# Patient Record
Sex: MALE | Race: NATIVE HAWAIIAN OR OTHER PACIFIC ISLANDER | NOT HISPANIC OR LATINO | URBAN - METROPOLITAN AREA
[De-identification: names, ages, dates, MRNs, and addresses within clinical notes are randomized per-mention and may not be internally consistent; named-entity substitution may affect disease eponyms.]

---

## 2019-04-14 ENCOUNTER — INPATIENT (INPATIENT)
Facility: HOSPITAL | Age: 84
LOS: 2 days | Discharge: NURSING, REHAB OR LONG-TERM CARE | DRG: 57 | End: 2019-04-17
Payer: MEDICARE

## 2019-04-14 VITALS
OXYGEN SATURATION: 96 % | RESPIRATION RATE: 20 BRPM | TEMPERATURE: 98 F | WEIGHT: 149.91 LBS | DIASTOLIC BLOOD PRESSURE: 78 MMHG | HEART RATE: 81 BPM | SYSTOLIC BLOOD PRESSURE: 120 MMHG

## 2019-04-14 DIAGNOSIS — R63.8 OTHER SYMPTOMS AND SIGNS CONCERNING FOOD AND FLUID INTAKE: ICD-10-CM

## 2019-04-14 DIAGNOSIS — Z91.89 OTHER SPECIFIED PERSONAL RISK FACTORS, NOT ELSEWHERE CLASSIFIED: ICD-10-CM

## 2019-04-14 DIAGNOSIS — I10 ESSENTIAL (PRIMARY) HYPERTENSION: ICD-10-CM

## 2019-04-14 DIAGNOSIS — I63.9 CEREBRAL INFARCTION, UNSPECIFIED: ICD-10-CM

## 2019-04-14 DIAGNOSIS — Z90.49 ACQUIRED ABSENCE OF OTHER SPECIFIED PARTS OF DIGESTIVE TRACT: Chronic | ICD-10-CM
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
## 2019-04-14 DIAGNOSIS — G20 PARKINSON'S DISEASE: ICD-10-CM

## 2019-04-14 DIAGNOSIS — N40.0 BENIGN PROSTATIC HYPERPLASIA WITHOUT LOWER URINARY TRACT SYMPTOMS: ICD-10-CM

## 2019-04-14 LAB
ALBUMIN SERPL ELPH-MCNC: 2.8 G/DL — LOW (ref 3.3–5)
ALP SERPL-CCNC: 108 U/L — SIGNIFICANT CHANGE UP (ref 40–120)
ALT FLD-CCNC: 11 U/L — SIGNIFICANT CHANGE UP (ref 10–45)
ANION GAP SERPL CALC-SCNC: 12 MMOL/L — SIGNIFICANT CHANGE UP (ref 5–17)
APPEARANCE UR: CLEAR — SIGNIFICANT CHANGE UP
APTT BLD: 22.4 SEC — LOW (ref 27.5–36.3)
AST SERPL-CCNC: 28 U/L — SIGNIFICANT CHANGE UP (ref 10–40)
BASOPHILS # BLD AUTO: 0.02 K/UL — SIGNIFICANT CHANGE UP (ref 0–0.2)
BASOPHILS NFR BLD AUTO: 0.2 % — SIGNIFICANT CHANGE UP (ref 0–2)
BILIRUB SERPL-MCNC: 0.7 MG/DL — SIGNIFICANT CHANGE UP (ref 0.2–1.2)
BILIRUB UR-MCNC: NEGATIVE — SIGNIFICANT CHANGE UP
BUN SERPL-MCNC: 27 MG/DL — HIGH (ref 7–23)
CALCIUM SERPL-MCNC: 8.6 MG/DL — SIGNIFICANT CHANGE UP (ref 8.4–10.5)
CHLORIDE SERPL-SCNC: 103 MMOL/L — SIGNIFICANT CHANGE UP (ref 96–108)
CO2 SERPL-SCNC: 22 MMOL/L — SIGNIFICANT CHANGE UP (ref 22–31)
COLOR SPEC: YELLOW — SIGNIFICANT CHANGE UP
CREAT SERPL-MCNC: 0.8 MG/DL — SIGNIFICANT CHANGE UP (ref 0.5–1.3)
DIFF PNL FLD: NEGATIVE — SIGNIFICANT CHANGE UP
EOSINOPHIL # BLD AUTO: 0.06 K/UL — SIGNIFICANT CHANGE UP (ref 0–0.5)
EOSINOPHIL NFR BLD AUTO: 0.5 % — SIGNIFICANT CHANGE UP (ref 0–6)
GLUCOSE SERPL-MCNC: 114 MG/DL — HIGH (ref 70–99)
GLUCOSE UR QL: NEGATIVE — SIGNIFICANT CHANGE UP
HCT VFR BLD CALC: 40.7 % — SIGNIFICANT CHANGE UP (ref 39–50)
HGB BLD-MCNC: 13.7 G/DL — SIGNIFICANT CHANGE UP (ref 13–17)
IMM GRANULOCYTES NFR BLD AUTO: 1 % — SIGNIFICANT CHANGE UP (ref 0–1.5)
INR BLD: 1.17 — HIGH (ref 0.88–1.16)
KETONES UR-MCNC: ABNORMAL MG/DL
LEUKOCYTE ESTERASE UR-ACNC: NEGATIVE — SIGNIFICANT CHANGE UP
LYMPHOCYTES # BLD AUTO: 0.87 K/UL — LOW (ref 1–3.3)
LYMPHOCYTES # BLD AUTO: 7.8 % — LOW (ref 13–44)
MCHC RBC-ENTMCNC: 31.1 PG — SIGNIFICANT CHANGE UP (ref 27–34)
MCHC RBC-ENTMCNC: 33.7 GM/DL — SIGNIFICANT CHANGE UP (ref 32–36)
MCV RBC AUTO: 92.3 FL — SIGNIFICANT CHANGE UP (ref 80–100)
MONOCYTES # BLD AUTO: 0.94 K/UL — HIGH (ref 0–0.9)
MONOCYTES NFR BLD AUTO: 8.4 % — SIGNIFICANT CHANGE UP (ref 2–14)
NEUTROPHILS # BLD AUTO: 9.18 K/UL — HIGH (ref 1.8–7.4)
NEUTROPHILS NFR BLD AUTO: 82.1 % — HIGH (ref 43–77)
NITRITE UR-MCNC: NEGATIVE — SIGNIFICANT CHANGE UP
NRBC # BLD: 0 /100 WBCS — SIGNIFICANT CHANGE UP (ref 0–0)
PH UR: 6 — SIGNIFICANT CHANGE UP (ref 5–8)
PLATELET # BLD AUTO: 234 K/UL — SIGNIFICANT CHANGE UP (ref 150–400)
POTASSIUM SERPL-MCNC: 4 MMOL/L — SIGNIFICANT CHANGE UP (ref 3.5–5.3)
POTASSIUM SERPL-SCNC: 4 MMOL/L — SIGNIFICANT CHANGE UP (ref 3.5–5.3)
PROT SERPL-MCNC: 6.5 G/DL — SIGNIFICANT CHANGE UP (ref 6–8.3)
PROT UR-MCNC: ABNORMAL MG/DL
PROTHROM AB SERPL-ACNC: 13.3 SEC — HIGH (ref 10–12.9)
RBC # BLD: 4.41 M/UL — SIGNIFICANT CHANGE UP (ref 4.2–5.8)
RBC # FLD: 13.6 % — SIGNIFICANT CHANGE UP (ref 10.3–14.5)
SODIUM SERPL-SCNC: 137 MMOL/L — SIGNIFICANT CHANGE UP (ref 135–145)
SP GR SPEC: 1.01 — SIGNIFICANT CHANGE UP (ref 1–1.03)
UROBILINOGEN FLD QL: 1 E.U./DL — SIGNIFICANT CHANGE UP
WBC # BLD: 11.18 K/UL — HIGH (ref 3.8–10.5)
WBC # FLD AUTO: 11.18 K/UL — HIGH (ref 3.8–10.5)

## 2019-04-14 PROCEDURE — 70450 CT HEAD/BRAIN W/O DYE: CPT | Mod: 26

## 2019-04-14 PROCEDURE — 71101 X-RAY EXAM UNILAT RIBS/CHEST: CPT | Mod: 26,RT

## 2019-04-14 PROCEDURE — 73130 X-RAY EXAM OF HAND: CPT | Mod: 26,RT

## 2019-04-14 PROCEDURE — 72125 CT NECK SPINE W/O DYE: CPT | Mod: 26

## 2019-04-14 PROCEDURE — 93010 ELECTROCARDIOGRAM REPORT: CPT

## 2019-04-14 PROCEDURE — 99285 EMERGENCY DEPT VISIT HI MDM: CPT | Mod: 25

## 2019-04-14 PROCEDURE — 99222 1ST HOSP IP/OBS MODERATE 55: CPT | Mod: GC

## 2019-04-14 RX ORDER — ASPIRIN/CALCIUM CARB/MAGNESIUM 324 MG
1 TABLET ORAL
Qty: 0 | Refills: 0 | COMMUNITY

## 2019-04-14 RX ORDER — FINASTERIDE 5 MG/1
5 TABLET, FILM COATED ORAL DAILY
Qty: 0 | Refills: 0 | Status: DISCONTINUED | OUTPATIENT
Start: 2019-04-14 | End: 2019-04-17

## 2019-04-14 RX ORDER — SODIUM CHLORIDE 9 MG/ML
1000 INJECTION INTRAMUSCULAR; INTRAVENOUS; SUBCUTANEOUS ONCE
Qty: 0 | Refills: 0 | Status: COMPLETED | OUTPATIENT
Start: 2019-04-14 | End: 2019-04-14

## 2019-04-14 RX ORDER — HEPARIN SODIUM 5000 [USP'U]/ML
5000 INJECTION INTRAVENOUS; SUBCUTANEOUS EVERY 8 HOURS
Qty: 0 | Refills: 0 | Status: DISCONTINUED | OUTPATIENT
Start: 2019-04-14 | End: 2019-04-17

## 2019-04-14 RX ORDER — CARBIDOPA AND LEVODOPA 25; 100 MG/1; MG/1
1 TABLET ORAL
Qty: 0 | Refills: 0 | COMMUNITY

## 2019-04-14 RX ORDER — CARBIDOPA AND LEVODOPA 25; 100 MG/1; MG/1
0 TABLET ORAL
Qty: 0 | Refills: 0 | COMMUNITY

## 2019-04-14 RX ORDER — ASPIRIN/CALCIUM CARB/MAGNESIUM 324 MG
81 TABLET ORAL DAILY
Qty: 0 | Refills: 0 | Status: DISCONTINUED | OUTPATIENT
Start: 2019-04-14 | End: 2019-04-17

## 2019-04-14 RX ORDER — CARBIDOPA AND LEVODOPA 25; 100 MG/1; MG/1
1 TABLET ORAL EVERY 6 HOURS
Qty: 0 | Refills: 0 | Status: DISCONTINUED | OUTPATIENT
Start: 2019-04-14 | End: 2019-04-17

## 2019-04-14 RX ORDER — FINASTERIDE 5 MG/1
0 TABLET, FILM COATED ORAL
Qty: 0 | Refills: 0 | COMMUNITY

## 2019-04-14 RX ADMIN — SODIUM CHLORIDE 1000 MILLILITER(S): 9 INJECTION INTRAMUSCULAR; INTRAVENOUS; SUBCUTANEOUS at 14:34

## 2019-04-14 RX ADMIN — HEPARIN SODIUM 5000 UNIT(S): 5000 INJECTION INTRAVENOUS; SUBCUTANEOUS at 22:29

## 2019-04-14 NOTE — H&P ADULT - PROBLEM SELECTOR PLAN 1
Patient presenting with increasing falls 2-3 with no LOC but has hit head while trying to get up from fall. No AC but is ASA 81mg at home. CT head with no intracranial hemorrhage or calvarial fracture generalized volume loss with small vessel ischemic and lacunar disease. Cervicothoracic levoscoliosis with superimposed approximately 4 mm anterolisthesis of C3 on C4. Multilevel degenerative disc disease with neural foramen narrowing. Patient denied chest pain, SOB, palpitations preceding falls but does admit to lightheadedness when getting to seated position and from seated to standing position. Falls likely mechanical, possibly due to progession of Parkinson's Disease especially in the setting of non-adherence vs. orthostatic hypotension given symptomatic when seated to rise vs. deconditioning. Unlikely neurologic or cardiogenic given lack of symptoms, CT imaging negative.  -PT consult  -social work consult  -f/u TSH  -f/u folate, vitamin B12  -f/u CK  -fall risk precautions  -ambulate with assistance  -f/u orthostatics  -holding ASA 81mg in the setting of fall risk Patient presenting with increasing falls 2-3 with no LOC but has hit head while trying to get up from fall. No AC but is ASA 81mg at home. CT head with no intracranial hemorrhage or calvarial fracture generalized volume loss with small vessel ischemic and lacunar disease. Cervicothoracic levoscoliosis with superimposed approximately 4 mm anterolisthesis of C3 on C4. Multilevel degenerative disc disease with neural foramen narrowing. Patient denied chest pain, SOB, palpitations preceding falls but does admit to lightheadedness when getting to seated position and from seated to standing position. Falls likely mechanical, possibly due to progession of Parkinson's Disease especially in the setting of non-adherence vs. orthostatic hypotension given symptomatic when seated to rise vs. deconditioning. Unlikely neurologic or cardiogenic given lack of symptoms, CT imaging negative.  -PT consult  -social work consult  -f/u TSH  -f/u vitamin B12  -f/u CK  -fall risk precautions  -ambulate with assistance  -f/u orthostatics

## 2019-04-14 NOTE — H&P ADULT - PROBLEM SELECTOR PLAN 7
1) PCP Contacted on Admission: (Y/N) --> Name & Phone #: PCP will require collateral in the AM   2) Date of Contact with PCP:  3) PCP Contacted at Discharge: (Y/N, N/A)  4) Summary of Handoff Given to PCP:   5) Post-Discharge Appointment Date and Location:

## 2019-04-14 NOTE — ED PROVIDER NOTE - OBJECTIVE STATEMENT
85 yo male h/o parkinson's, htn c/o freq falls, inability to get out of bed today and that his family is very concerned about his safety walking and living alone.  Wife (who does not live w pt) is concerned that pt is no taking his sinemet 25/250 qid as he is supposed to bc he is sleeping.  Pt reports he struck his head while trying to get out of bed - No ha, change in vision/speech/gait, numbness or weakness in ext, n/v.  Pt also reports fall x 2 in the past 2-3 wk - injured R hand - now better but occasionally painful or numb (not now) and R chest wall - pain resolved.  Pt notes occasional neck pain and clicking sensation x 2-3 wk - none now.  No uri sx, cough, sob, abd pain, n/v/d, dysuria.  + urinary freq so pt has been restricting his liquids bc it's sometimes too hard to walk.  Pt lives in [place] but daughter picked him up and brought him to [facility] today.

## 2019-04-14 NOTE — H&P ADULT - NSHPSOCIALHISTORY_GEN_ALL_CORE
Tobacco use: Former smoker on social occasions. Poor historian regarding duration and how many but quit years ago.   EtOH use: Infrequent  Illicit drug use: Denies    Living situation: lives at home alone with no HHA, manages his own finances, medications, ADLs. Ambulates with cane at baseline.

## 2019-04-14 NOTE — ED ADULT NURSE NOTE - NSIMPLEMENTINTERV_GEN_ALL_ED
Implemented All Fall with Harm Risk Interventions:  [place] to call system. Call bell, personal items and telephone within reach. Instruct patient to call for assistance. Room bathroom lighting operational. Non-slip footwear when patient is off stretcher. Physically safe environment: no spills, clutter or unnecessary equipment. Stretcher in lowest position, wheels locked, appropriate side rails in place. Provide visual cue, wrist band, yellow gown, etc. Monitor gait and stability. Monitor for mental status changes and reorient to person, place, and time. Review medications for side effects contributing to fall risk. Reinforce activity limits and safety measures with patient and family. Provide visual clues: red socks.

## 2019-04-14 NOTE — H&P ADULT - NSHPOUTPATIENTPROVIDERS_GEN_ALL_CORE
Dr. Red Sánchez (Neuro: [phone]) Dr. Red Sánchez (Neuro: [phone])  Dr. Dale Miller (Cardiology: [phone])

## 2019-04-14 NOTE — ED ADULT TRIAGE NOTE - CHIEF COMPLAINT QUOTE
op pt brought in by daughter c/o increased weakness and forgetfulness for a few days now and falling yesterday, hitting his head. sustained laceration to the left side of forehead. pmh Parkinson's.

## 2019-04-14 NOTE — H&P ADULT - PROBLEM SELECTOR PLAN 2
Patient on home Carbidopa/Levodopa 25/250mg q6hrs with non-adherence to medication. Patient follows Dr. Red Sánchez for Parkinson's disease  -c/w home Carbidopa/Levodopa 25/250mg q6hrs   -collateral with home Neurologist in the AM  -consider neuro consult Patient on home Carbidopa/Levodopa 25/250mg q6hrs with non-adherence to medication. Patient follows Dr. Red Sánchez for Parkinson's disease  -c/w home Carbidopa/Levodopa 25/250mg q6hrs   -collateral with home Neurologist in the AM

## 2019-04-14 NOTE — H&P ADULT - NSICDXPASTMEDICALHX_GEN_ALL_CORE_FT
PAST MEDICAL HISTORY:  BPH (benign prostatic hyperplasia)     Diverticulitis s/p partial colectomy 20 years ago    HTN (hypertension)     Parkinsons disease     Stroke CVA 2010 no residual deficits

## 2019-04-14 NOTE — ED PROVIDER NOTE - MUSCULOSKELETAL, MLM
Spine appears normal, neck/back nontender, range of motion is not limited, no muscle or joint tenderness, old ecchymosis R 2nd mcp jt

## 2019-04-14 NOTE — H&P ADULT - NSHPPHYSICALEXAM_GEN_ALL_CORE
VITAL SIGNS:  T(C): 37.1 (04-14-19 @ 18:17), Max: 37.1 (04-14-19 @ 18:17)  T(F): 98.7 (04-14-19 @ 18:17), Max: 98.7 (04-14-19 @ 18:17)  HR: 72 (04-14-19 @ 18:17) (68 - 81)  BP: 118/69 (04-14-19 @ 18:17) (108/62 - 120/78)  BP(mean): --  RR: 18 (04-14-19 @ 18:17) (18 - 20)  SpO2: 95% (04-14-19 @ 18:17) (94% - 96%)  Wt(kg): --    PHYSICAL EXAM:    Constitutional: WDWN resting comfortably in bed; NAD  Head: Adhesive bandage superior to the right eyebrow clean, dry, intact; otherwise NC/AT  Eyes: PERRL, EOMI, clear conjunctiva  ENT: no nasal discharge; uvula midline, no oropharyngeal erythema or exudates; MMM  Neck: supple; mild JVD  Respiratory: CTA B/L; no W/R/R, no retractions  Cardiac: +S1/S2; RRR; no murmur heard  Gastrointestinal: soft, nondistended with mild diffuse abdominal tenderness to palpation. no rebound or guarding; +BSx4  Back: spine midline, no bony tenderness or step-offs  Extremities: WWP, no clubbing or cyanosis; no peripheral edema  Vascular: 2+ radial, DP/PT pulses B/L  Neurologic: AAOx3; CNII-XII grossly intact; no focal deficits    - Mental Status:  AAOx2-3 (states name, date off by 2 days but knows month and year, knows in hospital not which one); speech is fluent  - Cranial Nerves II-XII:    II:  PERRLA; visual fields are full to confrontation  III, IV, VI:  EOMI, no nystagmus  V:  facial sensation is intact in the V1-V3 distribution bilaterally.  VII:  face is symmetric with normal eye closure and smile  VIII:  hearing is intact to finger rub  IX, X:  uvula is midline and soft palate rises symmetrically  XI:  head turning and shoulder shrug are intact bilaterally  XII:  tongue protrudes in the midline  - Motor:  strength is 5/5 throughout; normal muscle bulk and tone throughout; no pronator drift. Mild cogwheel rigidity b/l UE that improved with continued passive flexion and extension. Bradykinesia present.  - Sensory:  grossly intact throughout  - Coordination:  right finger-nose-finger with dysmetria compared with left. Intact heel-knee-shin intact without dysmetria b/l  - Gait:  deferred

## 2019-04-14 NOTE — H&P ADULT - PROBLEM SELECTOR PLAN 3
Patient on home Novasc 5mg daily and 1/2 tab of Lisinopril 100mg daily at home  -holding for now given normotensive and may contribute to falls  -If BPs elevated, can consider restarting

## 2019-04-14 NOTE — ED ADULT NURSE NOTE - OBJECTIVE STATEMENT
85 y/o M walked brought in from front [facility] c/o generalized weakness x 1 week. Daughter at bedised reports, Hx of parkinsons. Pt has become increasingly forgetful and weak the past couple of days. unable to walk unassisted. Used wheelchair to get here. Fall multiple times over past week, Laceration noted to R merrick. No bleeding or swelling noted at site. no blood thinners. 87 y/o M walked brought in from front [facility] c/o generalized weakness x 1 week. Daughter at bedside reports, Hx of parkinsons. Pt has become increasingly forgetful and weak the past couple of days. unable to walk unassisted. Used wheelchair to get here. Fell multiple times over past week, + hit head, no LOC. Laceration noted to R forehead. No bleeding or swelling noted at site. no blood thinners. c/o occasional headache to frontal region ,no headache now. Incontinent X mos. daughter would like admission. no fevers, chills, SOB, n/v/d, abd pain. 85 y/o a&ox4 M walked brought in from front triage c/o generalized weakness x 1 week. Daughter at bedside reports, Hx of parkinsons. Pt has become increasingly forgetful and weak the past couple of days. unable to walk unassisted. Used wheelchair to get here. Fell multiple times over past week, + hit head, no LOC. Laceration noted to R forehead. No bleeding or swelling noted at site. no blood thinners. c/o occasional headache to frontal region ,no headache now. Incontinent X mos. daughter would like admission. no fevers, chills, SOB, n/v/d, abd pain.

## 2019-04-14 NOTE — H&P ADULT - PROBLEM SELECTOR PLAN 4
Patient and family attest to history of CVA in 2010 with no residual deficits.   -outpatient collateral Patient and family attest to history of CVA in 2010 with no residual deficits.   -unclear why patient is not on statin at home. will require outpatient collateral from neurologist   -c/w ASA 81mg daily

## 2019-04-14 NOTE — H&P ADULT - ASSESSMENT
Patient is a 86M PMHx of Parkinson's Disease, prior CVA 2010 with no residual deficits, HTN, BPH, diverticulitis s/p partial colectomy ~20 years ago, presenting for multiple falls over the past 1-2 weeks likely mechanical due to deconditioning vs. progression of Parkinson's disease.

## 2019-04-14 NOTE — H&P ADULT - HISTORY OF PRESENT ILLNESS
Patient is a 86M PMHx of Parkinson's Disease, prior CVA 2010 with no residual deficits, HTN, BPH, diverticulitis s/p partial colectomy ~20 years ago, presenting for multiple falls over the past 1-2 weeks. Patient and family at bedside described that patient has fell ~2-3 times with no LOC and did hit head while trying to get up s/p one of his falls yesterday. Patient endorsing right rib pain along the mid-axillary line and right hand numbness s/p fall and laying on his hand. Right rib pain improved, right hand numbness persists. Prior to increased freq of falling, patient had 1 mechanical fall 6 weeks ago after placing foot wrong on step. Patient denies ever feeling palpitations, chest pain, SOB but admitted to lightheadedness with sitting upward and when quickly standing from seated position. Patient admitted to occasional b/l ankle swelling that resolves at the end of the day and with PO Lasix PRN. Of note, patient is non-adherent with his home Carbidopa/Levodopa 25/250mg q6hrs, lives at home alone with no HHA, manages his own finances, medications, ADLs. Ambulates with cane at baseline. Patient also reported increased urinary frequency but no dysuria, which prompted him to decrease his PO fluid intake for fear of having to get up to use the restroom. Patient denied fevers, chills, night sweats, chest pain, SOB, n/v/d/c, headache, melena, hematochezia, dysphagia.     In the ED: vitals Patient is a 86M PMHx of Parkinson's Disease, prior CVA 2010 with no residual deficits, HTN, BPH, diverticulitis s/p partial colectomy ~20 years ago, presenting for multiple falls over the past 1-2 weeks. Patient and family at bedside described that patient has fell ~2-3 times with no LOC and did hit head while trying to get up s/p one of his falls yesterday. Patient endorsing right rib pain along the mid-axillary line and right hand numbness s/p fall and laying on his hand. Right rib pain improved, right hand numbness persists. Prior to increased freq of falling, patient had 1 mechanical fall 6 weeks ago after placing foot wrong on step. Patient denies ever feeling palpitations, chest pain, SOB but admitted to lightheadedness with sitting upward and when quickly standing from seated position. Patient admitted to occasional b/l ankle swelling that resolves at the end of the day and with PO Lasix PRN. Of note, patient is non-adherent with his home Carbidopa/Levodopa 25/250mg q6hrs, lives at home alone with no HHA, manages his own finances, medications, ADLs. Ambulates with cane at baseline. Patient also reported increased urinary frequency but no dysuria, which prompted him to decrease his PO fluid intake for fear of having to get up to use the restroom. Patient denied fevers, chills, night sweats, chest pain, SOB, n/v/d/c, headache, melena, hematochezia, dysphagia.     In the ED: vitals 98.2F, HR 81, /78, RR 20, SpO2 96% RA. Labs s/f WBC 11.18, INR 1.17, BUN 27. UA with trace urine ketones, trace bacteria, trace protein. CT head demonstrated no intracranial hemorrhage or calvarial fracture but with generalized volume loss with small vessel ischemic and lacunar disease. CT cervical spine with cervicothoracic levoscoliosis with superimposed approximately 4 mm anterolisthesis of C3 on C4; multilevel degenerative disc disease with neural foramen narrowing. EKG with NSR and left anterior fascicular block. Patient received IV NS bolus 1L x1. Patient admitted to [facility] for further management of recurrent falls.

## 2019-04-14 NOTE — ED PROVIDER NOTE - CARE PLAN
Principal Discharge DX:	Parkinsons disease  Secondary Diagnosis:	Failure to thrive in adult  Secondary Diagnosis:	Falls

## 2019-04-14 NOTE — H&P ADULT - NSHPLABSRESULTS_GEN_ALL_CORE
.  LABS:                         13.7   11.18 )-----------( 234      ( 2019 14:07 )             40.7     14    137  |  103  |  27<H>  ----------------------------<  114<H>  4.0   |  22  |  0.80    Ca    8.6      2019 14:07    TPro  6.5  /  Alb  2.8<L>  /  TBili  0.7  /  DBili  x   /  AST  28  /  ALT  11  /  AlkPhos  108  14    PT/INR - ( 2019 14:07 )   PT: 13.3 sec;   INR: 1.17          PTT - ( 2019 14:07 )  PTT:22.4 sec  Urinalysis Basic - ( 2019 14:19 )    Color: Yellow / Appearance: Clear / S.010 / pH: x  Gluc: x / Ketone: Trace mg/dL  / Bili: Negative / Urobili: 1.0 E.U./dL   Blood: x / Protein: Trace mg/dL / Nitrite: NEGATIVE   Leuk Esterase: NEGATIVE / RBC: < 5 /HPF / WBC < 5 /HPF   Sq Epi: x / Non Sq Epi: 0-5 /HPF / Bacteria: Present /HPF                RADIOLOGY, EKG & ADDITIONAL TESTS: Reviewed.   < from: CT Cervical Spine No Cont (19 @ 15:41) >    IMPRESSION: Cervicothoracic levoscoliosis with superimposed approximately   4 mm anterolisthesis of C3 on C4. Multilevel degenerative disc disease   with neural foramen narrowing as described above.    < end of copied text >    < from: CT Head No Cont (19 @ 15:41) >    IMPRESSION: No intracranial hemorrhage or calvarial fracture generalized   volume loss with small vessel ischemic and lacunar disease.

## 2019-04-14 NOTE — ED ADULT NURSE NOTE - CHIEF COMPLAINT QUOTE
pt brought in by daughter c/o increased weakness and forgetfulness for a few days now and falling yesterday, hitting his head. sustained laceration to the left side of forehead. pmh Parkinson's.

## 2019-04-14 NOTE — ED PROVIDER NOTE - CLINICAL SUMMARY MEDICAL DECISION MAKING FREE TEXT BOX
Pt c/o increased falls, difficulty ambulating and caring for himself, ?ably compliant w his sinemet.  Pt w chi, neck pain (none now, x 2-3 wk), R cw pain (none now, last wk), and R hand pain (none now, last wk) s/p falls.  Suspect pt w worsening parkinson's, mild dehydration,  and ftt, deconditioning.  Pt needs admit for pt safety and ALEIDA placement, maximization of his Parksinson's treatment.  Pt initially reluctant, but now agreeing to stay after discussion w CM.  No sx to suggest infection adding to pt's sx.  Plan labs, ct head/cspine, xrays, ua, ivf, admit.

## 2019-04-15 LAB
ANION GAP SERPL CALC-SCNC: 8 MMOL/L — SIGNIFICANT CHANGE UP (ref 5–17)
BLD GP AB SCN SERPL QL: NEGATIVE — SIGNIFICANT CHANGE UP
BUN SERPL-MCNC: 19 MG/DL — SIGNIFICANT CHANGE UP (ref 7–23)
CALCIUM SERPL-MCNC: 8.3 MG/DL — LOW (ref 8.4–10.5)
CHLORIDE SERPL-SCNC: 110 MMOL/L — HIGH (ref 96–108)
CK SERPL-CCNC: 82 U/L — SIGNIFICANT CHANGE UP (ref 30–200)
CO2 SERPL-SCNC: 21 MMOL/L — LOW (ref 22–31)
CREAT SERPL-MCNC: 0.68 MG/DL — SIGNIFICANT CHANGE UP (ref 0.5–1.3)
CULTURE RESULTS: NO GROWTH — SIGNIFICANT CHANGE UP
GLUCOSE SERPL-MCNC: 113 MG/DL — HIGH (ref 70–99)
HBA1C BLD-MCNC: 5.1 % — SIGNIFICANT CHANGE UP (ref 4–5.6)
HCT VFR BLD CALC: 42.8 % — SIGNIFICANT CHANGE UP (ref 39–50)
HGB BLD-MCNC: 13.9 G/DL — SIGNIFICANT CHANGE UP (ref 13–17)
MAGNESIUM SERPL-MCNC: 2.1 MG/DL — SIGNIFICANT CHANGE UP (ref 1.6–2.6)
MCHC RBC-ENTMCNC: 30.7 PG — SIGNIFICANT CHANGE UP (ref 27–34)
MCHC RBC-ENTMCNC: 32.5 GM/DL — SIGNIFICANT CHANGE UP (ref 32–36)
MCV RBC AUTO: 94.5 FL — SIGNIFICANT CHANGE UP (ref 80–100)
NRBC # BLD: 0 /100 WBCS — SIGNIFICANT CHANGE UP (ref 0–0)
PHOSPHATE SERPL-MCNC: 3.1 MG/DL — SIGNIFICANT CHANGE UP (ref 2.5–4.5)
PLATELET # BLD AUTO: 245 K/UL — SIGNIFICANT CHANGE UP (ref 150–400)
POTASSIUM SERPL-MCNC: 3.8 MMOL/L — SIGNIFICANT CHANGE UP (ref 3.5–5.3)
POTASSIUM SERPL-SCNC: 3.8 MMOL/L — SIGNIFICANT CHANGE UP (ref 3.5–5.3)
RBC # BLD: 4.53 M/UL — SIGNIFICANT CHANGE UP (ref 4.2–5.8)
RBC # FLD: 13.7 % — SIGNIFICANT CHANGE UP (ref 10.3–14.5)
RH IG SCN BLD-IMP: NEGATIVE — SIGNIFICANT CHANGE UP
SODIUM SERPL-SCNC: 139 MMOL/L — SIGNIFICANT CHANGE UP (ref 135–145)
SPECIMEN SOURCE: SIGNIFICANT CHANGE UP
TSH SERPL-MCNC: 0.56 UIU/ML — SIGNIFICANT CHANGE UP (ref 0.35–4.94)
VIT B12 SERPL-MCNC: 1115 PG/ML — SIGNIFICANT CHANGE UP (ref 232–1245)
WBC # BLD: 8.59 K/UL — SIGNIFICANT CHANGE UP (ref 3.8–10.5)
WBC # FLD AUTO: 8.59 K/UL — SIGNIFICANT CHANGE UP (ref 3.8–10.5)

## 2019-04-15 PROCEDURE — 99233 SBSQ HOSP IP/OBS HIGH 50: CPT | Mod: GC

## 2019-04-15 RX ORDER — POTASSIUM CHLORIDE 20 MEQ
20 PACKET (EA) ORAL ONCE
Qty: 0 | Refills: 0 | Status: COMPLETED | OUTPATIENT
Start: 2019-04-15 | End: 2019-04-15

## 2019-04-15 RX ORDER — SODIUM CHLORIDE 9 MG/ML
500 INJECTION INTRAMUSCULAR; INTRAVENOUS; SUBCUTANEOUS ONCE
Qty: 0 | Refills: 0 | Status: COMPLETED | OUTPATIENT
Start: 2019-04-15 | End: 2019-04-15

## 2019-04-15 RX ADMIN — CARBIDOPA AND LEVODOPA 1 TABLET(S): 25; 100 TABLET ORAL at 17:14

## 2019-04-15 RX ADMIN — HEPARIN SODIUM 5000 UNIT(S): 5000 INJECTION INTRAVENOUS; SUBCUTANEOUS at 22:53

## 2019-04-15 RX ADMIN — Medication 20 MILLIEQUIVALENT(S): at 09:33

## 2019-04-15 RX ADMIN — CARBIDOPA AND LEVODOPA 1 TABLET(S): 25; 100 TABLET ORAL at 07:26

## 2019-04-15 RX ADMIN — SODIUM CHLORIDE 2000 MILLILITER(S): 9 INJECTION INTRAMUSCULAR; INTRAVENOUS; SUBCUTANEOUS at 16:50

## 2019-04-15 RX ADMIN — Medication 81 MILLIGRAM(S): at 11:32

## 2019-04-15 RX ADMIN — CARBIDOPA AND LEVODOPA 1 TABLET(S): 25; 100 TABLET ORAL at 11:32

## 2019-04-15 RX ADMIN — CARBIDOPA AND LEVODOPA 1 TABLET(S): 25; 100 TABLET ORAL at 00:27

## 2019-04-15 RX ADMIN — FINASTERIDE 5 MILLIGRAM(S): 5 TABLET, FILM COATED ORAL at 11:32

## 2019-04-15 RX ADMIN — HEPARIN SODIUM 5000 UNIT(S): 5000 INJECTION INTRAVENOUS; SUBCUTANEOUS at 13:27

## 2019-04-15 RX ADMIN — HEPARIN SODIUM 5000 UNIT(S): 5000 INJECTION INTRAVENOUS; SUBCUTANEOUS at 07:27

## 2019-04-15 NOTE — PHYSICAL THERAPY INITIAL EVALUATION ADULT - GAIT DEVIATIONS NOTED, PT EVAL
decreased step length/decreased stride length/decreased weight-shifting ability/poor ability to navigate turns, required max verbal cues for sequencing/decreased ariel

## 2019-04-15 NOTE — PHYSICAL THERAPY INITIAL EVALUATION ADULT - MODALITIES TREATMENT COMMENTS
Patient encouraged to increased OOB intervals and ambulation with unit staff with Home exercise program (ABHAY jorgensen, ankle pumps)

## 2019-04-15 NOTE — PHYSICAL THERAPY INITIAL EVALUATION ADULT - PLANNED THERAPY INTERVENTIONS, PT EVAL
bed mobility training/gait training/postural re-education/ROM/strengthening/neuromuscular re-education/balance training/transfer training

## 2019-04-15 NOTE — DIETITIAN INITIAL EVALUATION ADULT. - PROBLEM SELECTOR PLAN 1
Patient presenting with increasing falls 2-3 with no LOC but has hit head while trying to get up from fall. No AC but is ASA 81mg at home. CT head with no intracranial hemorrhage or calvarial fracture generalized volume loss with small vessel ischemic and lacunar disease. Cervicothoracic levoscoliosis with superimposed approximately 4 mm anterolisthesis of C3 on C4. Multilevel degenerative disc disease with neural foramen narrowing. Patient denied chest pain, SOB, palpitations preceding falls but does admit to lightheadedness when getting to seated position and from seated to standing position. Falls likely mechanical, possibly due to progession of Parkinson's Disease especially in the setting of non-adherence vs. orthostatic hypotension given symptomatic when seated to rise vs. deconditioning. Unlikely neurologic or cardiogenic given lack of symptoms, CT imaging negative.  -PT consult  -social work consult  -f/u TSH  -f/u vitamin B12  -f/u CK  -fall risk precautions  -ambulate with assistance  -f/u orthostatics

## 2019-04-15 NOTE — CONSULT NOTE ADULT - ASSESSMENT
Patient is a 86M PMHx of Parkinson's Disease, prior CVA 2010 with no residual deficits, HTN, BPH, diverticulitis s/p partial colectomy ~20 years ago, presenting for multiple falls over the past 1-2 weeks likely mechanical due to deconditioning vs. progression of Parkinson's disease.    Problem/Plan - 1:  ·  Problem: Falls.  Plan: Patient presenting with increasing falls 2-3 with no LOC but has hit head while trying to get up from fall. No AC but is ASA 81mg at home. CT head with no intracranial hemorrhage or calvarial fracture generalized volume loss with small vessel ischemic and lacunar disease. Cervicothoracic levoscoliosis with superimposed approximately 4 mm anterolisthesis of C3 on C4. Multilevel degenerative disc disease with neural foramen narrowing. Patient denied chest pain, SOB, palpitations preceding falls but does admit to lightheadedness when getting to seated position and from seated to standing position. Falls likely mechanical, possibly due to progession of Parkinson's Disease especially in the setting of non-adherence vs. orthostatic hypotension given symptomatic when seated to rise vs. deconditioning. Unlikely neurologic or cardiogenic given lack of symptoms, CT imaging negative.  -social work consult  -f/u TSH  -f/u vitamin B12  -f/u CK  -fall risk precautions  -ambulate with assistance  -f/u orthostatics.     Problem/Plan - 2:  ·  Problem: Parkinsons disease.  Plan: Patient on home Carbidopa/Levodopa 25/250mg q6hrs with non-adherence to medication. Patient follows Dr. Red Sánchez for Parkinson's disease  -c/w home Carbidopa/Levodopa 25/250mg q6hrs   -collateral with home Neurologist in the AM.     Problem/Plan - 3:  ·  Problem: HTN (hypertension).  Plan: Patient on home Novasc 5mg daily and 1/2 tab of Lisinopril 100mg daily at home  -holding for now given normotensive and may contribute to falls  -If BPs elevated, can consider restarting.     Problem/Plan - 4:  ·  Problem: CVA (cerebral vascular accident).  Plan: Patient and family attest to history of CVA in 2010 with no residual deficits.   -unclear why patient is not on statin at home. will require outpatient collateral from neurologist   -c/w ASA 81mg daily.     Problem/Plan - 5:  ·  Problem: BPH (benign prostatic hyperplasia).  Plan: Patient with hx BPH on home finasteride 5mg daily  -c/w PO finasteride 5mg daily.

## 2019-04-15 NOTE — PROGRESS NOTE ADULT - SUBJECTIVE AND OBJECTIVE BOX
INCOMPLETE NOTE    OVERNIGHT EVENTS: CEE    SUBJECTIVE / INTERVAL HPI: Patient seen and examined at bedside. Patient with no acute complaints. Denied fevers, chills, night sweats, n/v/d/c, headache.     VITAL SIGNS:  Vital Signs Last 24 Hrs  T(C): 36.9 (15 Apr 2019 08:51), Max: 37.1 (2019 18:17)  T(F): 98.5 (15 Apr 2019 08:51), Max: 98.7 (2019 18:17)  HR: 67 (15 Apr 2019 08:51) (67 - 84)  BP: 131/78 (15 Apr 2019 08:51) (108/62 - 131/78)  BP(mean): --  RR: 16 (15 Apr 2019 08:51) (16 - 20)  SpO2: 95% (15 Apr 2019 08:51) (94% - 97%)    PHYSICAL EXAM:    General: WDWN  HEENT: NC/AT; PERRL, anicteric sclera; MMM  Neck: supple  Cardiovascular: +S1/S2, RRR  Respiratory: CTA B/L; no W/R/R  Gastrointestinal: soft, NT/ND; +BSx4  Extremities: WWP; no edema, clubbing or cyanosis  Vascular: 2+ radial, DP/PT pulses B/L  Neurological: AAOx3; no focal deficits    MEDICATIONS:  MEDICATIONS  (STANDING):  aspirin enteric coated 81 milliGRAM(s) Oral daily  carbidopa/levodopa  25/250 1 Tablet(s) Oral every 6 hours  finasteride 5 milliGRAM(s) Oral daily  heparin  Injectable 5000 Unit(s) SubCutaneous every 8 hours    MEDICATIONS  (PRN):      ALLERGIES:  Allergies    No Known Allergies    Intolerances        LABS:                        13.9   8.59  )-----------( 245      ( 15 Apr 2019 07:36 )             42.8     04-15    139  |  110<H>  |  19  ----------------------------<  113<H>  3.8   |  21<L>  |  0.68    Ca    8.3<L>      15 Apr 2019 07:36  Phos  3.1     04-15  Mg     2.1     04-15    TPro  6.5  /  Alb  2.8<L>  /  TBili  0.7  /  DBili  x   /  AST  28  /  ALT  11  /  AlkPhos  108  04-14    PT/INR - ( 2019 14:07 )   PT: 13.3 sec;   INR: 1.17          PTT - ( 2019 14:07 )  PTT:22.4 sec  Urinalysis Basic - ( 2019 14:19 )    Color: Yellow / Appearance: Clear / S.010 / pH: x  Gluc: x / Ketone: Trace mg/dL  / Bili: Negative / Urobili: 1.0 E.U./dL   Blood: x / Protein: Trace mg/dL / Nitrite: NEGATIVE   Leuk Esterase: NEGATIVE / RBC: < 5 /HPF / WBC < 5 /HPF   Sq Epi: x / Non Sq Epi: 0-5 /HPF / Bacteria: Present /HPF      CAPILLARY BLOOD GLUCOSE      POCT Blood Glucose.: 102 mg/dL (2019 13:44)      RADIOLOGY & ADDITIONAL TESTS: Reviewed. OVERNIGHT EVENTS: CEE    SUBJECTIVE / INTERVAL HPI: Patient seen and examined at bedside. Patient with no acute complaints. Denied fevers, chills, night sweats, n/v/d/c, headache.     VITAL SIGNS:  Vital Signs Last 24 Hrs  T(C): 36.9 (15 Apr 2019 08:51), Max: 37.1 (2019 18:17)  T(F): 98.5 (15 Apr 2019 08:51), Max: 98.7 (2019 18:17)  HR: 67 (15 Apr 2019 08:51) (67 - 84)  BP: 131/78 (15 Apr 2019 08:51) (108/62 - 131/78)  BP(mean): --  RR: 16 (15 Apr 2019 08:51) (16 - 20)  SpO2: 95% (15 Apr 2019 08:51) (94% - 97%)    PHYSICAL EXAM:    General: Resting comfortably in bed; NAD  HEENT: Adhesive bandage superior to the right eyebrow clean, dry, intact; otherwise NC/AT; Eyes: PERRL, EOMI, clear conjunctiva, MMM  Neck: supple  Respiratory: CTA B/L; no W/R/R, no retractions  Cardiac: +S1/S2; RRR; no murmur heard  Gastrointestinal: soft, nondistended with mild diffuse abdominal tenderness to palpation. no rebound or guarding; +BSx4  Extremities: No erythema, no edema, no cyanosis  Vascular: 2+ radial, DP/PT pulses B/L  Neurologic: AAOx3; CNII-XII grossly intact; no focal deficits  - Mental Status:  AAOx2-3 (states name, date off by 2 days but knows month and year, knows in hospital not which one); speech is fluent  - Cranial Nerves II-XII:    II:  PERRLA; visual fields are full to confrontation  III, IV, VI:  EOMI, no nystagmus  V:  facial sensation is intact in the V1-V3 distribution bilaterally.  VII:  face is symmetric with normal eye closure and smile  VIII:  hearing is intact to finger rub  IX, X:  uvula is midline and soft palate rises symmetrically  XI:  head turning and shoulder shrug are intact bilaterally  XII:  tongue protrudes in the midline  - Motor:  strength is 5/5 throughout; normal muscle bulk and tone throughout; no pronator drift. Mild cogwheel rigidity best appreciated on RLE. Bradykinesia present.  - Sensory:  grossly intact throughout  - Coordination:  right finger-nose-finger with dysmetria compared with left. Intact heel-knee-shin intact without dysmetria b/l  - Gait:  deferred    MEDICATIONS:  MEDICATIONS  (STANDING):  aspirin enteric coated 81 milliGRAM(s) Oral daily  carbidopa/levodopa  25/250 1 Tablet(s) Oral every 6 hours  finasteride 5 milliGRAM(s) Oral daily  heparin  Injectable 5000 Unit(s) SubCutaneous every 8 hours    MEDICATIONS  (PRN):      ALLERGIES:  Allergies    No Known Allergies    Intolerances        LABS:                        13.9   8.59  )-----------( 245      ( 15 Apr 2019 07:36 )             42.8     04-15    139  |  110<H>  |  19  ----------------------------<  113<H>  3.8   |  21<L>  |  0.68    Ca    8.3<L>      15 Apr 2019 07:36  Phos  3.1     -15  Mg     2.1     -15    TPro  6.5  /  Alb  2.8<L>  /  TBili  0.7  /  DBili  x   /  AST  28  /  ALT  11  /  AlkPhos  108  04-14    PT/INR - ( 2019 14:07 )   PT: 13.3 sec;   INR: 1.17          PTT - ( 2019 14:07 )  PTT:22.4 sec  Urinalysis Basic - ( 2019 14:19 )    Color: Yellow / Appearance: Clear / S.010 / pH: x  Gluc: x / Ketone: Trace mg/dL  / Bili: Negative / Urobili: 1.0 E.U./dL   Blood: x / Protein: Trace mg/dL / Nitrite: NEGATIVE   Leuk Esterase: NEGATIVE / RBC: < 5 /HPF / WBC < 5 /HPF   Sq Epi: x / Non Sq Epi: 0-5 /HPF / Bacteria: Present /HPF      CAPILLARY BLOOD GLUCOSE      POCT Blood Glucose.: 102 mg/dL (2019 13:44)      RADIOLOGY & ADDITIONAL TESTS: Reviewed.

## 2019-04-15 NOTE — PHYSICAL THERAPY INITIAL EVALUATION ADULT - PERTINENT HX OF CURRENT PROBLEM, REHAB EVAL
86y old Male who presents with a chief complaint of Increased falls over 1-2 weeks, seen for PT [facility] hospital day # 2, patient with limited dynamic balance and strength placing him at high risk for falls. Anticipate Subacute Rehab placement to ensure safe discharge

## 2019-04-15 NOTE — PHYSICAL THERAPY INITIAL EVALUATION ADULT - IMPAIRMENTS CONTRIBUTING IMPAIRED BED MOBILITY, REHAB EVAL
impaired postural control/decreased strength/impaired motor control/narrow base of support/impaired balance

## 2019-04-15 NOTE — DIETITIAN INITIAL EVALUATION ADULT. - PROBLEM SELECTOR PLAN 4
Patient and family attest to history of CVA in 2010 with no residual deficits.   -unclear why patient is not on statin at home. will require outpatient collateral from neurologist   -c/w ASA 81mg daily

## 2019-04-15 NOTE — PHYSICAL THERAPY INITIAL EVALUATION ADULT - IMPAIRMENTS CONTRIBUTING TO GAIT DEVIATIONS, PT EVAL
impaired postural control/impaired balance/abnormal muscle tone/decreased strength/impaired motor control

## 2019-04-15 NOTE — PHYSICAL THERAPY INITIAL EVALUATION ADULT - IMPAIRMENTS FOUND, PT EVAL
gait, locomotion, and balance/aerobic capacity/endurance/ROM/posture/ergonomics and body mechanics/gross motor

## 2019-04-15 NOTE — PHYSICAL THERAPY INITIAL EVALUATION ADULT - ADDITIONAL COMMENTS
Patient reported 3 falls in the last 6 months, 12 steps upon entry to back of private, utilizes SC at home for indoor and outdoor ambulation receives meal on wheels, daughter bought a transport wheelchair (bedside) and rollator, shower chair. Patient is able to leave the house to manage recyclables and run errands

## 2019-04-15 NOTE — DIETITIAN INITIAL EVALUATION ADULT. - ENERGY NEEDS
ABW used for calculations as pt between % of IBW.   ABW 71kg, IBW 64kg, 110% IBW, ht 66", BMI 24.9   Nutrient needs based on [facility] standards of care for maintenance in older adults

## 2019-04-15 NOTE — PROGRESS NOTE ADULT - PROBLEM SELECTOR PLAN 1
Patient presenting with increasing falls 2-3 with no LOC but has hit head while trying to get up from fall. No AC but is ASA 81mg at home. CT head with no intracranial hemorrhage or calvarial fracture generalized volume loss with small vessel ischemic and lacunar disease. Cervicothoracic levoscoliosis with superimposed approximately 4 mm anterolisthesis of C3 on C4. Multilevel degenerative disc disease with neural foramen narrowing. Patient denied chest pain, SOB, palpitations preceding falls but does admit to lightheadedness when getting to seated position and from seated to standing position. Falls likely mechanical, possibly due to progession of Parkinson's Disease especially in the setting of non-adherence vs. orthostatic hypotension given symptomatic when seated to rise vs. deconditioning. Unlikely neurologic or cardiogenic given lack of symptoms, CT imaging negative.  -PT recommending ALEIDA, pending Auth  -social work consult  -TSH WNL  -f/u vitamin B12  - CK WNL  -fall risk precautions  -ambulate with assistance  -f/u orthostatics Patient presenting with increasing falls 2-3 with no LOC but has hit head while trying to get up from fall. No AC but is ASA 81mg at home. CT head with no intracranial hemorrhage or calvarial fracture generalized volume loss with small vessel ischemic and lacunar disease. Cervicothoracic levoscoliosis with superimposed approximately 4 mm anterolisthesis of C3 on C4. Multilevel degenerative disc disease with neural foramen narrowing. Patient denied chest pain, SOB, palpitations preceding falls but does admit to lightheadedness when getting to seated position and from seated to standing position. Falls likely mechanical, possibly due to progession of Parkinson's Disease especially in the setting of non-adherence vs. orthostatic hypotension given symptomatic when seated to rise vs. deconditioning. Unlikely neurologic or cardiogenic given lack of symptoms, CT imaging negative.  -PT recommending ALEIDA, pending Auth  -social work consult  -TSH WNL  -f/u vitamin B12  - CK WNL  -fall risk precautions  -ambulate with assistance  -orthostatics positive- s/p IV NS 500cc bolus x1. Likely due to autonomic dysfunction in the setting of Parkinson's vs. dehydration from poor PO intake prior to presentation-  -f/u repeat orthostatics

## 2019-04-15 NOTE — DIETITIAN INITIAL EVALUATION ADULT. - OTHER INFO
86M PMHx of Parkinson's Disease, prior CVA 2010 with no residual deficits, HTN, BPH, diverticulitis s/p partial colectomy ~20 years ago, presenting for multiple falls over the past 1-2 weeks. Patient and family at bedside described that patient has fell ~2-3 times with no LOC and did hit head while trying to get up s/p one of his falls yesterday. Additionally, pt reports he has decreased PO and fluid intake intentionally so he does not have to get up to go to the bathroom frequently. Of note pt wife/ daughter live in [facility], pt lives alone, now having difficulty with ADLs, is non adherent to parkinsons medication. Etiology of falls thought to be mechanical vs advancement of Parkinsons disease. Family at bedside having lunch this afternoon, pt lethargic ~50% meal consumed, unsure if any wt loss PTA. No noted n/v/d/c, chewing/ swallowing issues or pain impacting intake, skin is intact. NKFA. Encouraged intake through day, will continue to follow per protocol. 86M PMHx of Parkinson's Disease, prior CVA 2010 with no residual deficits, HTN, BPH, diverticulitis s/p partial colectomy ~20 years ago, presenting for multiple falls over the past 1-2 weeks. Patient and family at bedside described that patient has fell ~2-3 times with no LOC and did hit head while trying to get up s/p one of his falls yesterday. Additionally, pt reports he has decreased PO and fluid intake intentionally so he does not have to get up to go to the bathroom frequently. Of note pt wife/ daughter live in [place], pt lives alone, now having difficulty with ADLs, is non adherent to parkinsons medication. Etiology of falls thought to be mechanical vs advancement of Parkinsons disease. Family at bedside having lunch this afternoon, pt lethargic ~50% meal consumed, unsure if any wt loss PTA, no visual s/sx of wasting or malnutrition noted. No noted n/v/d/c, chewing/ swallowing issues or pain impacting intake, skin is intact. NKFA. Encouraged intake through day, will continue to follow per protocol.

## 2019-04-15 NOTE — DIETITIAN INITIAL EVALUATION ADULT. - PROBLEM SELECTOR PLAN 2
Patient on home Carbidopa/Levodopa 25/250mg q6hrs with non-adherence to medication. Patient follows Dr. Red Sánchez for Parkinson's disease  -c/w home Carbidopa/Levodopa 25/250mg q6hrs   -collateral with home Neurologist in the AM

## 2019-04-15 NOTE — CONSULT NOTE ADULT - SUBJECTIVE AND OBJECTIVE BOX
Patient is a 86y old  Male who presents with a chief complaint of Increased falls over 1-2 weeks (15 Apr 2019 11:01)       HPI:  Patient is a 86M PMHx of Parkinson's Disease, prior CVA  with no residual deficits, HTN, BPH, diverticulitis s/p partial colectomy ~20 years ago, presenting for multiple falls over the past 1-2 weeks. Patient and family at bedside described that patient has fell ~2-3 times with no LOC and did hit head while trying to get up s/p one of his falls yesterday. Patient endorsing right rib pain along the mid-axillary line and right hand numbness s/p fall and laying on his hand. Right rib pain improved, right hand numbness persists. Prior to increased freq of falling, patient had 1 mechanical fall 6 weeks ago after placing foot wrong on step. Patient denies ever feeling palpitations, chest pain, SOB but admitted to lightheadedness with sitting upward and when quickly standing from seated position. Patient admitted to occasional b/l ankle swelling that resolves at the end of the day and with PO Lasix PRN. Of note, patient is non-adherent with his home Carbidopa/Levodopa 25/250mg q6hrs, lives at home alone with no HHA, manages his own finances, medications, ADLs. Ambulates with cane at baseline. Patient also reported increased urinary frequency but no dysuria, which prompted him to decrease his PO fluid intake for fear of having to get up to use the restroom. Patient denied fevers, chills, night sweats, chest pain, SOB, n/v/d/c, headache, melena, hematochezia, dysphagia.     In the ED: vitals 98.2F, HR 81, /78, RR 20, SpO2 96% RA. Labs s/f WBC 11.18, INR 1.17, BUN 27. UA with trace urine ketones, trace bacteria, trace protein. CT head demonstrated no intracranial hemorrhage or calvarial fracture but with generalized volume loss with small vessel ischemic and lacunar disease. CT cervical spine with cervicothoracic levoscoliosis with superimposed approximately 4 mm anterolisthesis of C3 on C4; multilevel degenerative disc disease with neural foramen narrowing. EKG with NSR and left anterior fascicular block. Patient received IV NS bolus 1L x1. Patient admitted to [facility] for further management of recurrent falls. (2019 18:31)      PAST MEDICAL & SURGICAL HISTORY:  Stroke: CVA 2010 no residual deficits  BPH (benign prostatic hyperplasia)  Diverticulitis: s/p partial colectomy 20 years ago  HTN (hypertension)  Parkinsons disease  History of partial colectomy: for diverticulitis 20 years ago      MEDICATIONS  (STANDING):  aspirin enteric coated 81 milliGRAM(s) Oral daily  carbidopa/levodopa  25/250 1 Tablet(s) Oral every 6 hours  finasteride 5 milliGRAM(s) Oral daily  heparin  Injectable 5000 Unit(s) SubCutaneous every 8 hours    MEDICATIONS  (PRN):      Social History: , wife lives in [place], has 2 daughters 1 lives in [place], the other lives in [place], lives alone in a private house in [place], [place], 10 steps to enter, 1 flight of stairs to bedrooms, no home care services    Functional Level Prior to Admission: ADL independent, walks with a cane, daughter just purchased a rolling walker    FAMILY HISTORY:  No pertinent family history in first degree relatives      CBC Full  -  ( 15 Apr 2019 07:36 )  WBC Count : 8.59 K/uL  RBC Count : 4.53 M/uL  Hemoglobin : 13.9 g/dL  Hematocrit : 42.8 %  Platelet Count - Automated : 245 K/uL  Mean Cell Volume : 94.5 fl  Mean Cell Hemoglobin : 30.7 pg  Mean Cell Hemoglobin Concentration : 32.5 gm/dL  Auto Neutrophil # : x  Auto Lymphocyte # : x  Auto Monocyte # : x  Auto Eosinophil # : x  Auto Basophil # : x  Auto Neutrophil % : x  Auto Lymphocyte % : x  Auto Monocyte % : x  Auto Eosinophil % : x  Auto Basophil % : x      -15    139  |  110<H>  |  19  ----------------------------<  113<H>  3.8   |  21<L>  |  0.68    Ca    8.3<L>      15 Apr 2019 07:36  Phos  3.1     04-15  Mg     2.1     -15    TPro  6.5  /  Alb  2.8<L>  /  TBili  0.7  /  DBili  x   /  AST  28  /  ALT  11  /  AlkPhos  108  04-14      Urinalysis Basic - ( 2019 14:19 )    Color: Yellow / Appearance: Clear / S.010 / pH: x  Gluc: x / Ketone: Trace mg/dL  / Bili: Negative / Urobili: 1.0 E.U./dL   Blood: x / Protein: Trace mg/dL / Nitrite: NEGATIVE   Leuk Esterase: NEGATIVE / RBC: < 5 /HPF / WBC < 5 /HPF   Sq Epi: x / Non Sq Epi: 0-5 /HPF / Bacteria: Present /HPF          Radiology:    < from: CT Head No Cont (19 @ 15:41) >  EXAM:  CT BRAIN                          PROCEDURE DATE:  2019          INTERPRETATION:  PROCEDURE: CT brain without intravenous contrast    INDICATION: Trauma; status post fall    TECHNIQUE: Multiple axial images were obtained at 5 mm intervals from the   skull base to the vertex. Sagittal and coronal reformatted images were   obtained from the axial data set. The images were reviewed in brain and   bone windows.    COMPARISON: None    FINDINGS: The CT examination demonstrates generalized volume loss. There   is no midline shift or extra axial collections. The gray white   differentiation appears within normal limits. There is no intracranial   hemorrhage or acute transcortical infarct. There is moderate patchy areas   of hypodensitywithin the periventricular white matter which may   represent the sequela of small vessel ischemic disease. There are   punctate hypodensities within the basal ganglia and thalamus bilaterally   as well as the left external capsule compatible with lacunar infarcts of   indeterminate age. The bony windows demonstrates no fractures. A nasal   prosthesis is present which is incompletely evaluated. The visualized   paranasal sinuses are within normal limits. The mastoid air cells are   well aerated.    IMPRESSION: No intracranial hemorrhage or calvarial fracture generalized   volume loss with small vessel ischemic and lacunar disease.         < from: CT Cervical Spine No Cont (19 @ 15:41) >  EXAM:  CT CERVICAL SPINE                          PROCEDURE DATE:  2019          INTERPRETATION:  PROCEDURE: CT cervical spine without contrast    INDICATION: Trauma; status post fall    TECHNIQUE: Multiple axial sections were obtained from the mid orbits to   the sternoclavicular joint. Sagittal and coronal reformats were obtained   from the axial data set. The images were reviewed in soft tissue and bone   windows.    COMPARISON: None    FINDINGS: The CT examination demonstrates scoliosis of the   cervicothoracic spine with the apex to the left centered at the C7/T1   level. There is approximately 4 mm of anterolisthesis of C3 on C4. There   is loss of intervertebral disc space height at the C4/5 through the C6/7   levels with anterior osteophytic lipping. There is minimum disc vacuum   phenomenon at C5/6 and C6/7. The vertebral body heights are maintained.   The prevertebral soft tissues are within normal limits. There are   degenerative changes at the C1-C2 level. The osseous structures are   intact without fracture.     At the C2/3 level, there is a small central disc herniation. There is   marked left facet hypertrophy with moderate to severe left neural foramen   narrowing. There is no central spinal canal stenosis.    At the C3/4 level, there is uncovering of the intervertebral disc space   with disc bulge. There are marked degenerative changes involving the   facets bilaterally (left greater than right). There is moderate right and   severe left neural foramen narrowing. There is no central spinal canal   stenosis.    At the C4/5 level, there is osseous ridging with disc bulge indenting the   thecal sac. There is bilateral uncovertebral joint and degenerative facet   disease. There is moderate to severe bilateral neural foramen narrowing.   There is no spinal canal stenosis.    At the C5/6 level, there is osseous ridging with disc bulge indenting the   thecal sac. There is bilateral uncovertebral joint and degenerative facet   disease (left greater than right). There is moderate to severe bilateral   neural foramen narrowing.    At the C6/7 level, there is osseous ridging with disc bulge indenting the   thecal sac. There is bilateral uncovertebral joint and degenerative facet   disease (left greater than right). There is severe bilateral neural   foramen narrowing.    At the C7/T1 level, there is no disc herniation. There is no central   spinal canal stenosis or neural foramen narrowing.    IMPRESSION: Cervicothoracic levoscoliosis with superimposed approximately   4 mm anterolisthesis of C3 on C4. Multilevel degenerative disc disease   with neural foramen narrowing as described above.              Vital Signs Last 24 Hrs  T(C): 36.9 (15 Apr 2019 08:51), Max: 37.1 (2019 18:17)  T(F): 98.5 (15 Apr 2019 08:51), Max: 98.7 (2019 18:17)  HR: 67 (15 Apr 2019 08:51) (67 - 84)  BP: 131/78 (15 Apr 2019 08:51) (108/62 - 131/78)  BP(mean): --  RR: 16 (15 Apr 2019 08:51) (16 - 18)  SpO2: 95% (15 Apr 2019 08:51) (94% - 97%)    REVIEW OF SYSTEMS:    CONSTITUTIONAL: fatigue  EYES: No eye pain, visual disturbances, or discharge  ENMT:  No difficulty hearing, tinnitus, vertigo; No sinus or throat pain  NECK: No pain or stiffness  BREASTS: No pain, masses, or nipple discharge  RESPIRATORY: No cough, wheezing, chills or hemoptysis; No shortness of breath  CARDIOVASCULAR: No chest pain, palpitations, dizziness, or leg swelling  GASTROINTESTINAL: No abdominal or epigastric pain. No nausea, vomiting, or hematemesis; No diarrhea or constipation. No melena or hematochezia.  GENITOURINARY: No dysuria, frequency, hematuria, or incontinence  NEUROLOGICAL: loss of balance  SKIN: No itching, burning, rashes, or lesions   LYMPH NODES: No enlarged glands  ENDOCRINE: No heat or cold intolerance; No hair loss  MUSCULOSKELETAL: No joint pain or swelling; No muscle, back, or extremity pain  PSYCHIATRIC: No depression, anxiety, mood swings, or difficulty sleeping  HEME/LYMPH: No easy bruising, or bleeding gums  ALLERGY AND IMMUNOLOGIC: No hives or eczema  VASCULAR: no swelling, erythema      Physical Exam: 87 yo  gentleman lying in semi James's position, c/o fatigue and loss of balance    Head: normocephalic, forehead bandage    Eyes: PERRLA, EOMI, no nystagmus, sclera anicteric    ENT: nasal discharge, uvula midline, no oropharyngeal erythema/exudate    Neck: supple, negative JVD, negative carotid bruits, no thyromegaly    Chest: CTA bilaterally, neg wheeze, rhonchi, rales, crackles, egophany    Cardiovascular: regular rate and rhythm, neg murmurs/rubs/gallops    Abdomen: soft, non distended, non tender, negative rebound/guarding, normal bowel sounds, neg hepatosplenomegaly    Extremities: WWP, neg cyanosis/clubbing/edema, negative calf tenderness to palpation, negative Meaghan's sign, mild UE>LE rigidity    :     Neurologic Exam:    Alert and oriented to person, place, date/year, speech fluent w/o dysarthria, recent and remote memory intact, repetition intact, comprehension intact,     Cranial Nerves:     II:                       pupils equal, round and reactive to light, visual fields intact   III/ IV/VI:            extraocular movements intact, neg nystagmus, ptosis  V:                       facial sensation intact, V1-3 normal  VII:                     face symmetric, no droop, normal eye closure and smile  VIII:                    hearing intact to finger rub bilaterally  IX/ X:                 soft palate rise symmetrical  XI:                      head turning, shoulder shrug normal  XII:                     tongue midline    Motor Exam:    Upper Extremities:     RIght:   no focal weakness               negative drift    Left :      no focal weakness               negative drift    Lower Extremities:                 Right:      no focal weakness    Left:      no focal weakness      Sensory:    intact to LT/PP in all UE/LE dermatomes    DTR:            = biceps/     triceps/     brachioradialis                      = patella/   medial hamstring/ankle                      neg clonus                      neg Babinski                      neg Hoffmans    Gait:  not tested        PM&R Impression:    1) s/p fall  2) deconditioned  3) gait imbalance  4) Parkinsons disease        Recommendations:    1) Physical therapy focusing on therapeutic exercises, bed mobility/transfer out of bed evaluation, progressive ambulation with assistive devices prn.    2) Anticipated Disposition Plan/Recs: subacute rehab placement

## 2019-04-16 DIAGNOSIS — Z71.89 OTHER SPECIFIED COUNSELING: ICD-10-CM

## 2019-04-16 DIAGNOSIS — I95.1 ORTHOSTATIC HYPOTENSION: ICD-10-CM

## 2019-04-16 LAB
ANION GAP SERPL CALC-SCNC: 11 MMOL/L — SIGNIFICANT CHANGE UP (ref 5–17)
BUN SERPL-MCNC: 14 MG/DL — SIGNIFICANT CHANGE UP (ref 7–23)
CALCIUM SERPL-MCNC: 8.4 MG/DL — SIGNIFICANT CHANGE UP (ref 8.4–10.5)
CHLORIDE SERPL-SCNC: 107 MMOL/L — SIGNIFICANT CHANGE UP (ref 96–108)
CO2 SERPL-SCNC: 22 MMOL/L — SIGNIFICANT CHANGE UP (ref 22–31)
CREAT SERPL-MCNC: 0.7 MG/DL — SIGNIFICANT CHANGE UP (ref 0.5–1.3)
GLUCOSE SERPL-MCNC: 117 MG/DL — HIGH (ref 70–99)
HCT VFR BLD CALC: 42.4 % — SIGNIFICANT CHANGE UP (ref 39–50)
HGB BLD-MCNC: 13.7 G/DL — SIGNIFICANT CHANGE UP (ref 13–17)
MAGNESIUM SERPL-MCNC: 2 MG/DL — SIGNIFICANT CHANGE UP (ref 1.6–2.6)
MCHC RBC-ENTMCNC: 30.6 PG — SIGNIFICANT CHANGE UP (ref 27–34)
MCHC RBC-ENTMCNC: 32.3 GM/DL — SIGNIFICANT CHANGE UP (ref 32–36)
MCV RBC AUTO: 94.9 FL — SIGNIFICANT CHANGE UP (ref 80–100)
NRBC # BLD: 0 /100 WBCS — SIGNIFICANT CHANGE UP (ref 0–0)
PLATELET # BLD AUTO: 297 K/UL — SIGNIFICANT CHANGE UP (ref 150–400)
POTASSIUM SERPL-MCNC: 3.9 MMOL/L — SIGNIFICANT CHANGE UP (ref 3.5–5.3)
POTASSIUM SERPL-SCNC: 3.9 MMOL/L — SIGNIFICANT CHANGE UP (ref 3.5–5.3)
RBC # BLD: 4.47 M/UL — SIGNIFICANT CHANGE UP (ref 4.2–5.8)
RBC # FLD: 13.8 % — SIGNIFICANT CHANGE UP (ref 10.3–14.5)
SODIUM SERPL-SCNC: 140 MMOL/L — SIGNIFICANT CHANGE UP (ref 135–145)
WBC # BLD: 8.03 K/UL — SIGNIFICANT CHANGE UP (ref 3.8–10.5)
WBC # FLD AUTO: 8.03 K/UL — SIGNIFICANT CHANGE UP (ref 3.8–10.5)

## 2019-04-16 PROCEDURE — 99233 SBSQ HOSP IP/OBS HIGH 50: CPT | Mod: GC

## 2019-04-16 RX ORDER — POTASSIUM CHLORIDE 20 MEQ
20 PACKET (EA) ORAL ONCE
Qty: 0 | Refills: 0 | Status: COMPLETED | OUTPATIENT
Start: 2019-04-16 | End: 2019-04-16

## 2019-04-16 RX ADMIN — CARBIDOPA AND LEVODOPA 1 TABLET(S): 25; 100 TABLET ORAL at 23:09

## 2019-04-16 RX ADMIN — Medication 81 MILLIGRAM(S): at 12:16

## 2019-04-16 RX ADMIN — CARBIDOPA AND LEVODOPA 1 TABLET(S): 25; 100 TABLET ORAL at 18:10

## 2019-04-16 RX ADMIN — FINASTERIDE 5 MILLIGRAM(S): 5 TABLET, FILM COATED ORAL at 12:16

## 2019-04-16 RX ADMIN — HEPARIN SODIUM 5000 UNIT(S): 5000 INJECTION INTRAVENOUS; SUBCUTANEOUS at 06:34

## 2019-04-16 RX ADMIN — HEPARIN SODIUM 5000 UNIT(S): 5000 INJECTION INTRAVENOUS; SUBCUTANEOUS at 23:09

## 2019-04-16 RX ADMIN — CARBIDOPA AND LEVODOPA 1 TABLET(S): 25; 100 TABLET ORAL at 06:34

## 2019-04-16 RX ADMIN — CARBIDOPA AND LEVODOPA 1 TABLET(S): 25; 100 TABLET ORAL at 00:02

## 2019-04-16 RX ADMIN — CARBIDOPA AND LEVODOPA 1 TABLET(S): 25; 100 TABLET ORAL at 12:16

## 2019-04-16 RX ADMIN — Medication 20 MILLIEQUIVALENT(S): at 09:26

## 2019-04-16 NOTE — PROGRESS NOTE ADULT - SUBJECTIVE AND OBJECTIVE BOX
Physical Medicine and Rehabilitation Progress Note:    Patient is a 86y old  Male who presents with a chief complaint of Increased falls over 1-2 weeks (16 Apr 2019 15:02)      HPI:  Patient is a 86M PMHx of Parkinson's Disease, prior CVA 2010 with no residual deficits, HTN, BPH, diverticulitis s/p partial colectomy ~20 years ago, presenting for multiple falls over the past 1-2 weeks. Patient and family at bedside described that patient has fell ~2-3 times with no LOC and did hit head while trying to get up s/p one of his falls yesterday. Patient endorsing right rib pain along the mid-axillary line and right hand numbness s/p fall and laying on his hand. Right rib pain improved, right hand numbness persists. Prior to increased freq of falling, patient had 1 mechanical fall 6 weeks ago after placing foot wrong on step. Patient denies ever feeling palpitations, chest pain, SOB but admitted to lightheadedness with sitting upward and when quickly standing from seated position. Patient admitted to occasional b/l ankle swelling that resolves at the end of the day and with PO Lasix PRN. Of note, patient is non-adherent with his home Carbidopa/Levodopa 25/250mg q6hrs, lives at home alone with no HHA, manages his own finances, medications, ADLs. Ambulates with cane at baseline. Patient also reported increased urinary frequency but no dysuria, which prompted him to decrease his PO fluid intake for fear of having to get up to use the restroom. Patient denied fevers, chills, night sweats, chest pain, SOB, n/v/d/c, headache, melena, hematochezia, dysphagia.     In the ED: vitals 98.2F, HR 81, /78, RR 20, SpO2 96% RA. Labs s/f WBC 11.18, INR 1.17, BUN 27. UA with trace urine ketones, trace bacteria, trace protein. CT head demonstrated no intracranial hemorrhage or calvarial fracture but with generalized volume loss with small vessel ischemic and lacunar disease. CT cervical spine with cervicothoracic levoscoliosis with superimposed approximately 4 mm anterolisthesis of C3 on C4; multilevel degenerative disc disease with neural foramen narrowing. EKG with NSR and left anterior fascicular block. Patient received IV NS bolus 1L x1. Patient admitted to [facility] for further management of recurrent falls. (14 Apr 2019 18:31)                            13.7   8.03  )-----------( 297      ( 16 Apr 2019 06:39 )             42.4       04-16    140  |  107  |  14  ----------------------------<  117<H>  3.9   |  22  |  0.70    Ca    8.4      16 Apr 2019 06:39  Phos  3.1     04-15  Mg     2.0     04-16      Vital Signs Last 24 Hrs  T(C): 36.4 (16 Apr 2019 15:08), Max: 37 (16 Apr 2019 09:27)  T(F): 97.6 (16 Apr 2019 15:08), Max: 98.6 (16 Apr 2019 09:27)  HR: 65 (16 Apr 2019 15:08) (65 - 81)  BP: 145/88 (16 Apr 2019 15:08) (123/77 - 155/83)  BP(mean): --  RR: 18 (16 Apr 2019 15:08) (16 - 20)  SpO2: 96% (16 Apr 2019 15:08) (94% - 96%)    MEDICATIONS  (STANDING):  aspirin enteric coated 81 milliGRAM(s) Oral daily  carbidopa/levodopa  25/250 1 Tablet(s) Oral every 6 hours  finasteride 5 milliGRAM(s) Oral daily  heparin  Injectable 5000 Unit(s) SubCutaneous every 8 hours    MEDICATIONS  (PRN):    Currently Undergoing Physical Therapy at bedside.    Functional Status Assessment:    Previous Level of Function:     · Ambulation Skills	needs device	  · Transfer Skills	needs device	  · ADL Skills	needs device	  · Work/Leisure Activity	needs device	  · Additional Comments	Patient reported 3 falls in the last 6 months, 12 steps upon entry to back of private, utilizes [place] at home for indoor and outdoor ambulation receives meal on wheels, daughter bought a transport wheelchair (bedside) and rollator, shower chair. Patient is able to leave the house to manage recyclables and run errands	    Cognitive Status Examination:   · Level of Consciousness	alert	  · Follows Commands and Answers Questions	100% of the time	  · Personal Safety and Judgment	intact	    Skin:   Skin:  · Skin Integrity	intact	    Range of Motion Exam:   · Active Range of Motion Examination	bilateral  lower extremity Active ROM was WFL (within functional limits); bilateral upper extremity Active ROM was WFL (within functional limits)	    Manual Muscle Testing:   · Manual Muscle Testing Results	B UE and B LE >3+/5 upon functional assessment against gravity.	    Bed Mobility: Rolling/Turning:     · Level of [place]	maximum assist (25% patients effort)	  · Physical Assist/Nonphysical Assist	1 person assist	    Bed Mobility: Scooting/Bridging:     · Level of [place]	maximum assist (25% patients effort)	  · Physical Assist/Nonphysical Assist	1 person assist; verbal cues	  · Assistive Device	bed rails	    Bed Mobility: Supine to Sit:     · Level of [place]	maximum assist (25% patients effort)	  · Physical Assist/Nonphysical Assist	1 person assist	  · Assistive Device	bed rails	    Bed Mobility Analysis:     · Bed Mobility Limitations	decreased ability to use legs for bridging/pushing; decreased ability to use arms for pushing/pulling	  · Impairments Contributing to Impaired Bed Mobility	impaired postural control; narrow base of support; impaired balance; decreased strength; impaired motor control	    Transfer: Sit to Stand:     · Level of [place]	minimum assist (75% patients effort); moderate assist (50% patients effort)	  · Physical Assist/Nonphysical Assist	1 person assist; verbal cues	  · Weight-Bearing Restrictions	full weight-bearing	  · Assistive Device	rolling walker	    Transfer: Stand to Sit:     · Level of [place]	stand-by assist; (+) posterior LOB retropulsive	  · Physical Assist/Nonphysical Assist	1 person assist; verbal cues	  · Weight-Bearing Restrictions	full weight-bearing	  · Assistive Device	rolling walker	    Sit/Stand Transfer Safety Analysis:     · Transfer Safety Concerns Noted	decreased weight-shifting ability	  · Impairments Contributing to Impaired Transfers	impaired coordination; impaired motor control; impaired postural control; narrow base of support	    Gait Skills:     · Level of [place]	stand-by assist	  · Physical Assist/Nonphysical Assist	1 person assist	  · Weight-Bearing Restrictions	full weight-bearing	  · Assistive Device	rolling walker	  · Gait Distance	60 feet	    Gait Analysis:     · Gait Pattern Used	3-point gait  patient required verbal cues for postural awareness	  · Gait Deviations Noted	decreased ariel; decreased step length; decreased stride length; decreased weight-shifting ability; poor ability to navigate turns, required max verbal cues for sequencing	  · Impairments Contributing to Gait Deviations	impaired balance; decreased strength; abnormal muscle tone; impaired postural control; impaired motor control	    Balance Skills Assessment:     · Sitting Balance: Static	fair balance	  · Sitting Balance: Dynamic	poor plus	  · Sit-to-Stand Balance	poor minus	  · Standing Balance: Static	poor minus	  · Standing Balance: Dynamic	fair minus  progressed to standby assist	    Treatment Location:   · Comments	Patient encouraged to increased OOB intervals and ambulation with unit staff with Home exercise program (ABHAY jorgensen, ankle pumps)	    Clinical Impressions:   · Criteria for Skilled Therapeutic Interventions	impairments found; rehab potential; therapy frequency; anticipated discharge recommendation	  · Impairments Found (describe specific impairments)	aerobic capacity/endurance; gross motor; ROM; posture; gait, locomotion, and balance; ergonomics and body mechanics	  · Functional Limitations in Following Categories (describe specific limitations)	self-care; home management; work; community/leisure	  · Risk Reduction/Prevention (Describe Specific Areas of risk reduction/prevention)	risk factors	  · Risk Areas	fall	  · Rehab Potential	good, to achieve stated therapy goals	  · Therapy Frequency	2-3x/week	  		        PM&R Impression: as above    Disposition Plan Recommendations: subacute rehab placement

## 2019-04-16 NOTE — PROGRESS NOTE ADULT - PROBLEM SELECTOR PLAN 6
Patient with hx BPH on home finasteride 5mg daily  -c/w PO finasteride 5mg daily
F: No IVF  E: Replete PRN  N: DASH diet  DVT ppx: HSQ  Dispo: [facility]  FULL CODE

## 2019-04-16 NOTE — PROGRESS NOTE ADULT - ATTENDING COMMENTS
Patient was seen and examined with the resident team today.  I agree with Dr. Grajeda's assessment and plan with the following exceptions/additions:     Briefly, this is an  87yo gentleman with a PMH of Parkinson's Disease, CVA w/no residual deficits (2010), HTN, BPH and diverticulitis s/p partial colectomy who p/w recurrent, mechanical falls i/s/o deconditioning v poorly controlled PD 2/2 medication non-adherence.     -- restart PD regimen  -- PT eval   -- SW, RE: home services, likely [facility]  -- DVT PPx - [facility]  -- Dispo - TBD    Jaimie Cash  [phone]
Patient was seen and examined with the resident team today.  I agree with Dr. Grajeda's assessment and plan with the following exceptions/additions:     Briefly, this is an  85yo gentleman with a PMH of Parkinson's Disease, CVA w/no residual deficits (2010), HTN, BPH and diverticulitis s/p partial colectomy who p/w recurrent, mechanical falls i/s/o deconditioning v poorly controlled PD 2/2 medication non-adherence v orthostatic hypotension (2/2 hypovolemia v autonomic dysfunction).  NAEO.  PT recommending [facility]; family on board.  VS - -150.  Exam - nontoxic, slightly disheveled, CTA B no w/r/r, RRR no m/g/r, +BS soft NTND, WWP, AOx2-3.    -- c/w PD med  -- Pall Care consult  -- recheck orthostatics today   -- DVT PPx - [facility]  -- Dispo - [facility]    Jaimie Cash  [phone]

## 2019-04-16 NOTE — DISCHARGE NOTE PROVIDER - CARE PROVIDER_API CALL
[facility],   [address] [place] [address]  [place] [address]  Phone: [phone]  Fax: (   )    -  Follow Up Time: 2 weeks    Red Sánchez  [address] [place] [id number]  Phone: [phone]  Fax: (   )    -  Follow Up Time:

## 2019-04-16 NOTE — PROGRESS NOTE ADULT - PROBLEM SELECTOR PLAN 9
1) PCP Contacted on Admission: (Y/N) --> Name & Phone #: Attempted collateral with neurologist, who is away. Number for call back provided with office for covering physician to call back  2) Date of Contact with PCP:  3) PCP Contacted at Discharge: (Y/N, N/A)  4) Summary of Handoff Given to PCP:   5) Post-Discharge Appointment Date and Location:

## 2019-04-16 NOTE — DISCHARGE NOTE PROVIDER - HOSPITAL COURSE
Patient is a 86M PMHx of Parkinson's Disease, prior CVA 2010 with no residual deficits, HTN, BPH, diverticulitis s/p partial colectomy ~20 years ago, presenting for multiple falls over the past 1-2 weeks. Patient and family at bedside described that patient has fell ~2-3 times with no LOC and did hit head while trying to get up s/p one of his falls yesterday. Patient endorsing right rib pain along the mid-axillary line and right hand numbness s/p fall and laying on his hand. Right rib pain improved, right hand numbness persisted. Denied palpitations, chest pain, SOB but admitted to lightheadedness with sitting upward and when quickly standing from seated position. Patient admitted to occasional b/l ankle swelling that resolves at the end of the day and with PO Lasix PRN. Of note, patient is non-adherent with his home Carbidopa/Levodopa 25/250mg q6hrs, lives at home alone with no HHA, manages his own finances, medications, ADLs. Ambulates with cane at baseline. Patient also reported increased urinary frequency but no dysuria, which prompted him to decrease his PO fluid intake for fear of having to get up to use the restroom. Patient denied fevers, chills, night sweats, chest pain, SOB, n/v/d/c, headache, melena, hematochezia, dysphagia.  In the ED: vitals 98.2F, HR 81, /78, RR 20, SpO2 96% RA. Labs s/f WBC 11.18, INR 1.17, BUN 27. UA with trace urine ketones, trace bacteria, trace protein. CT head demonstrated no intracranial hemorrhage or calvarial fracture but with generalized volume loss with small vessel ischemic and lacunar disease. CT cervical spine with cervicothoracic levoscoliosis with superimposed approximately 4 mm anterolisthesis of C3 on C4; multilevel degenerative disc disease with neural foramen narrowing. EKG with NSR and left anterior fascicular block. Patient received IV NS bolus 1L x1. Patient admitted to [facility], [place] for further management of recurrent falls. Patient was resumed on home Carbidopa/Levodopa. TSH, Vitamin B12, HbA1C, CK WNL. PT saw patient and recommended ALEIDA. Orthostative hypotension positive, s/p IV  cc bolus x1. Repeat orthostatics negative x2 thereafter. Patient had GOC conversation indicated preference for DNR/DNI with trial of BiPAP, IVF, IV antibx, and NGT but no PEG tube. MOLST in chart. Patient stable for discharge to [facility] with outpatient neurology follow up with Neurologist at first available appointment (5/6/19 at 3:30PM). Patient is a 86M PMHx of Parkinson's Disease, prior CVA 2010 with no residual deficits, HTN, BPH, diverticulitis s/p partial colectomy ~20 years ago, presenting for multiple falls over the past 1-2 weeks. Patient and family at bedside described that patient has fell ~2-3 times with no LOC and did hit head while trying to get up s/p one of his falls yesterday. Patient endorsing right rib pain along the mid-axillary line and right hand numbness s/p fall and laying on his hand. Right rib pain improved, right hand numbness persisted. Denied palpitations, chest pain, SOB but admitted to lightheadedness with sitting upward and when quickly standing from seated position. Patient admitted to occasional b/l ankle swelling that resolves at the end of the day and with PO Lasix PRN. Of note, patient is non-adherent with his home Carbidopa/Levodopa 25/250mg q6hrs, lives at home alone with no HHA, manages his own finances, medications, ADLs. Ambulates with cane at baseline. Patient also reported increased urinary frequency but no dysuria, which prompted him to decrease his PO fluid intake for fear of having to get up to use the restroom. Patient denied fevers, chills, night sweats, chest pain, SOB, n/v/d/c, headache, melena, hematochezia, dysphagia.  In the ED: vitals 98.2F, HR 81, /78, RR 20, SpO2 96% RA. Labs s/f WBC 11.18, INR 1.17, BUN 27. UA with trace urine ketones, trace bacteria, trace protein. CT head demonstrated no intracranial hemorrhage or calvarial fracture but with generalized volume loss with small vessel ischemic and lacunar disease. CT cervical spine with cervicothoracic levoscoliosis with superimposed approximately 4 mm anterolisthesis of C3 on C4; multilevel degenerative disc disease with neural foramen narrowing. EKG with NSR and left anterior fascicular block. Patient received IV NS bolus 1L x1. Patient admitted to [facility] for further management of recurrent falls. Patient was resumed on home Carbidopa/Levodopa. TSH, Vitamin B12, HbA1C, CK WNL. PT saw patient and recommended ALEIDA. Orthostative hypotension positive, s/p IV  cc bolus x1. Repeat orthostatics negative x2 thereafter. Patient had GOC conversation indicated preference for DNR/DNI with trial of BiPAP, IVF, IV antibx, and NGT but no PEG tube. MOLST in chart. Patient stable for discharge to [facility] with outpatient neurology follow up with Neurologist at first available appointment (5/6/19 at 3:30PM). Patient expressed willingness and understanding to make follow up appointment with PCP within 1-2 weeks after discharge from [facility] Patient is a 86M PMHx of Parkinson's Disease, prior CVA 2010 with no residual deficits, HTN, BPH, diverticulitis s/p partial colectomy ~20 years ago, presenting for multiple falls over the past 1-2 weeks. Patient and family at bedside described that patient has fell ~2-3 times with no LOC and did hit head while trying to get up s/p one of his falls yesterday. Patient endorsing right rib pain along the mid-axillary line and right hand numbness s/p fall and laying on his hand. Right rib pain improved, right hand numbness persisted. Denied palpitations, chest pain, SOB but admitted to lightheadedness with sitting upward and when quickly standing from seated position. Patient admitted to occasional b/l ankle swelling that resolves at the end of the day and with PO Lasix PRN. Of note, patient is non-adherent with his home Carbidopa/Levodopa 25/250mg q6hrs, lives at home alone with no HHA, manages his own finances, medications, ADLs. Ambulates with cane at baseline. Patient also reported increased urinary frequency but no dysuria, which prompted him to decrease his PO fluid intake for fear of having to get up to use the restroom. Patient denied fevers, chills, night sweats, chest pain, SOB, n/v/d/c, headache, melena, hematochezia, dysphagia.  In the ED: vitals 98.2F, HR 81, /78, RR 20, SpO2 96% RA. Labs s/f WBC 11.18, INR 1.17, BUN 27. UA with trace urine ketones, trace bacteria, trace protein. CT head demonstrated no intracranial hemorrhage or calvarial fracture but with generalized volume loss with small vessel ischemic and lacunar disease. CT cervical spine with cervicothoracic levoscoliosis with superimposed approximately 4 mm anterolisthesis of C3 on C4; multilevel degenerative disc disease with neural foramen narrowing. EKG with NSR and left anterior fascicular block. Patient received IV NS bolus 1L x1. Patient admitted to [facility] for further management of recurrent falls. Patient was resumed on home Carbidopa/Levodopa. TSH, Vitamin B12, HbA1C, CK WNL. PT saw patient and recommended ALEIDA. Orthostative hypotension positive, s/p IV  cc bolus x1. Repeat orthostatics negative x2 thereafter. Patient had GOC conversation indicated preference for DNR/DNI with trial of BiPAP, IVF, IV antibx, and NGT but no PEG tube. MOLST in chart. Patient stable for discharge to [facility] with outpatient neurology follow up with Neurologist at first available appointment (5/6/19 at 3:30PM). Patient expressed willingness and understanding to make follow up appointment with PCP within 1-2 weeks after discharge from [facility]. Holding home antihypertensives in setting of hypotension, consider re-starting if sBPs elevated.

## 2019-04-16 NOTE — PROGRESS NOTE ADULT - PROBLEM SELECTOR PLAN 2
Patient on home Carbidopa/Levodopa 25/250mg q6hrs with non-adherence to medication. Patient follows Dr. Red Sánchez for Parkinson's disease  -c/w home Carbidopa/Levodopa 25/250mg q6hrs   -collateral with home Neurologist, pending call back
Patient on home Carbidopa/Levodopa 25/250mg q6hrs with non-adherence to medication. Patient follows Dr. Red Sánchez for Parkinson's disease  -c/w home Carbidopa/Levodopa 25/250mg q6hrs   -collateral with home Neurologist, pending call back

## 2019-04-16 NOTE — PROGRESS NOTE ADULT - PROBLEM SELECTOR PLAN 8
1) PCP Contacted on Admission: (Y/N) --> Name & Phone #: Attempted collateral with neurologist, who is away. Number for call back provided with office for covering physician to call back  2) Date of Contact with PCP:  3) PCP Contacted at Discharge: (Y/N, N/A)  4) Summary of Handoff Given to PCP:   5) Post-Discharge Appointment Date and Location: F: IVF if orthostatics positive  E: Replete PRN  N: DASH diet  DVT ppx: HSQ  Dispo: [facility]  FULL CODE

## 2019-04-16 NOTE — PROGRESS NOTE ADULT - PROBLEM SELECTOR PLAN 1
Patient presenting with increasing falls 2-3 with no LOC but has hit head while trying to get up from fall. No AC but is ASA 81mg at home. CT head with no intracranial hemorrhage or calvarial fracture generalized volume loss with small vessel ischemic and lacunar disease. Cervicothoracic levoscoliosis with superimposed approximately 4 mm anterolisthesis of C3 on C4. Multilevel degenerative disc disease with neural foramen narrowing. Patient denied chest pain, SOB, palpitations preceding falls but does admit to lightheadedness when getting to seated position and from seated to standing position. Falls likely mechanical, possibly due to progession of Parkinson's Disease especially in the setting of non-adherence vs. orthostatic hypotension given symptomatic when seated to rise vs. deconditioning. Unlikely neurologic or cardiogenic given lack of symptoms, CT imaging negative.  -PT recommending ALEIDA, pending Auth  -social work consult  -TSH WNL  -B12 WNL  - CK WNL  -fall risk precautions  -ambulate with assistance  -as discussed below, orthostatic hypotension likely contributing factor

## 2019-04-16 NOTE — PROGRESS NOTE ADULT - PROBLEM SELECTOR PLAN 3
Patient admitted to lightheadedness when getting to seated position as well as from seated to standing. Orthostatics positive 4/15/19. In the setting of Parkinson's Disease possible autonomic with hypovolemia contributing given patient reporting decreased PO intake due to fear of falling when trying to get to the bathroom. s/p IV NS 500cc bolus x1 on 4/15 with repeat orthostatics negative  -f/u repeat orthostatics
Patient on home Novasc 5mg daily and 1/2 tab of Lisinopril 100mg daily at home  -holding for now given normotensive and may contribute to falls  -If BPs elevated, can consider restarting

## 2019-04-16 NOTE — PROGRESS NOTE ADULT - PROBLEM SELECTOR PLAN 4
Patient on home Novasc 5mg daily and 1/2 tab of Lisinopril 100mg daily at home  -holding for now given normotensive and may contribute to falls  -If BPs elevated, can consider restarting. Currently SBPs in the 120s.
Patient and family attest to history of CVA in 2010 with no residual deficits.   -unclear why patient is not on statin at home. will require outpatient collateral from neurologist   -c/w ASA 81mg daily

## 2019-04-16 NOTE — DISCHARGE NOTE PROVIDER - NSDCCPCAREPLAN_GEN_ALL_CORE_FT
PRINCIPAL DISCHARGE DIAGNOSIS  Diagnosis: Falls  Assessment and Plan of Treatment:       SECONDARY DISCHARGE DIAGNOSES  Diagnosis: Parkinson's disease  Assessment and Plan of Treatment: PRINCIPAL DISCHARGE DIAGNOSIS  Diagnosis: Falls  Assessment and Plan of Treatment: You came to the hospital after having more frequent falls. Given your lack of symptoms, your history of Parkinson's disease, and history of not taking your Carbidopa/Levodopa we believe your falls are likely due to deconditioning or progression of Parkinson's Disease. Orthostative hypotension could also be contributing to your falls. Your labs did not show signs of infection, vitamin deficiencies or, metabolic derrangements that would contribute to your falls. It is unlikely that an arrhythmia, or abnormal heart rhythm, was responsbile for your falls given lack of symptoms. You also had a CT scan of the head and neck, which did not show any signs of fracture. You were seen by physical therapy who recommended that you go to a subacute rehab facility to gain strength. Please make a follow up appointment with your primary care physician within 1-2 weeks after discharge from the subacute rehab facility.      SECONDARY DISCHARGE DIAGNOSES  Diagnosis: Parkinson's disease  Assessment and Plan of Treatment: You have a history of Parkinson's Disease and report that you have not been taking your home dose of Carbidopa/Levodopa. Please continue with your home dose of Carbidopa/Levodopa as prescribed. We made you an appointment with your outpatient Neurologist, Dr. Red Sánchez. The soonest available appointment was on 5/6/19 at 3:30PM. Please attend this appointment.    Diagnosis: Orthostatic hypotension  Assessment and Plan of Treatment: You came in with falls that was likely due to worsening muscle strength or progression of your Parkinson's disease. You were also having lightheadedness when sitting up and standing. You had your blood pressure taken when you were laying down, sitting, and standing. Your blood pressure dropped too much when you were standing. This is called orthostatic hypotension, which can be due to your Parkinson's Disease or dehydration. You got IV fluids and when we rechecked your blood pressure it was within a normal range. Please continue to take plenty of fluids by mouth and follow up with your primary care physician within 1-2 weeks of discharge from the hospital.    Diagnosis: Hypertension  Assessment and Plan of Treatment: Please continue your home medications and follow up with your primary care physician.    Diagnosis: History of BPH  Assessment and Plan of Treatment: Please continue your home medications and follow up with your primary care physician.    Diagnosis: History of CVA (cerebrovascular accident)  Assessment and Plan of Treatment: Please continue your home medications and follow up with your primary care physician and neurologist    Diagnosis: Transition of care performed with sharing of clinical summary  Assessment and Plan of Treatment: 1) PCP Contacted on Admission: (Y/N) --> Name & Phone #: Attempted contact with outpatient neurologist Dr. Red Sánchez, who was not available. Offered to speak with covering physician and number provided for call back.    2) Date of Contact with PCP: Spoke with office staff on 4/16/19  3) PCP Contacted at Discharge: (Y/N, N/A): Attempted   4) Summary of Handoff Given to PCP:   5) Post-Discharge Appointment Date and Location: Dr. Sánchez 5/6/19 at 3:30PM.

## 2019-04-16 NOTE — PROGRESS NOTE ADULT - PROBLEM SELECTOR PLAN 5
Patient and family attest to history of CVA in 2010 with no residual deficits.   -unclear why patient is not on statin at home. will require outpatient collateral from neurologist   -c/w ASA 81mg daily
Patient with hx BPH on home finasteride 5mg daily  -c/w PO finasteride 5mg daily

## 2019-04-16 NOTE — DISCHARGE NOTE PROVIDER - NSDCFUADDAPPT_GEN_ALL_CORE_FT
Please go to your appointment that we scheduled for you with your neurologist Dr. Red Sánchez on 5/6/19 at 3:30PM.    Please make an appointment with your primary care physician within 1-2 weeks after you leave subacute rehabilitation.

## 2019-04-16 NOTE — DISCHARGE NOTE PROVIDER - PROVIDER TOKENS
FREE:[LAST:[[facility]],PHONE:[phone]:[phone]   [phone]    -],ADDRESS:[[address] [place] [address]],FOLLOWUP:[2 weeks]],FREE:[LAST:[Gonzalo],FIRST:[Red],PHONE:[phone]:[phone]   [phone]    -],ADDRESS:[[address] [place] [id number]]]

## 2019-04-16 NOTE — PROGRESS NOTE ADULT - SUBJECTIVE AND OBJECTIVE BOX
OVERNIGHT EVENTS: CEE    SUBJECTIVE / INTERVAL HPI: Patient seen and examined at bedside. Patient with no complaints. Denied fevers, chills, night sweats, chest pain, SOB, n/v/d/c, abdominal pain, lightheadedness, dizziness.     VITAL SIGNS:  Vital Signs Last 24 Hrs  T(C): 37 (2019 09:27), Max: 37 (2019 09:27)  T(F): 98.6 (2019 09:27), Max: 98.6 (2019 09:27)  HR: 69 (2019 09:27) (67 - 81)  BP: 123/77 (2019 09:27) (97/62 - 155/83)  BP(mean): --  RR: 20 (2019 09:27) (16 - 20)  SpO2: 95% (2019 09:27) (94% - 97%)    PHYSICAL EXAM:    General: WDWN, NAD   HEENT: NC/AT; PERRL, anicteric sclera; MMM  Neck: supple  Cardiovascular: +S1/S2, RRR  Respiratory: CTA B/L; no W/R/R  Gastrointestinal: soft, NT/ND; +BSx4  Extremities: No erythema, no edema, no cyanosis b/l  Vascular: 2+ radial, DP/PT pulses B/L  Neurological: AAOx3; no focal deficits. Bradykinesia. Cog-wheel rigidity in LE.     MEDICATIONS:  MEDICATIONS  (STANDING):  aspirin enteric coated 81 milliGRAM(s) Oral daily  carbidopa/levodopa  25/250 1 Tablet(s) Oral every 6 hours  finasteride 5 milliGRAM(s) Oral daily  heparin  Injectable 5000 Unit(s) SubCutaneous every 8 hours    MEDICATIONS  (PRN):      ALLERGIES:  Allergies    No Known Allergies    Intolerances        LABS:                        13.7   8.03  )-----------( 297      ( 2019 06:39 )             42.4     04-16    140  |  107  |  14  ----------------------------<  117<H>  3.9   |  22  |  0.70    Ca    8.4      2019 06:39  Phos  3.1     04-15  Mg     2.0     04-16    TPro  6.5  /  Alb  2.8<L>  /  TBili  0.7  /  DBili  x   /  AST  28  /  ALT  11  /  AlkPhos  108  04-14    PT/INR - ( 2019 14:07 )   PT: 13.3 sec;   INR: 1.17          PTT - ( 2019 14:07 )  PTT:22.4 sec  Urinalysis Basic - ( 2019 14:19 )    Color: Yellow / Appearance: Clear / S.010 / pH: x  Gluc: x / Ketone: Trace mg/dL  / Bili: Negative / Urobili: 1.0 E.U./dL   Blood: x / Protein: Trace mg/dL / Nitrite: NEGATIVE   Leuk Esterase: NEGATIVE / RBC: < 5 /HPF / WBC < 5 /HPF   Sq Epi: x / Non Sq Epi: 0-5 /HPF / Bacteria: Present /HPF      CAPILLARY BLOOD GLUCOSE      POCT Blood Glucose.: 102 mg/dL (2019 13:44)      RADIOLOGY & ADDITIONAL TESTS: Reviewed.

## 2019-04-16 NOTE — PROGRESS NOTE ADULT - ASSESSMENT
Patient is a 86M PMHx of Parkinson's Disease, prior CVA 2010 with no residual deficits, HTN, BPH, diverticulitis s/p partial colectomy ~20 years ago, presenting for multiple falls over the past 1-2 weeks likely mechanical due to deconditioning vs. progression of Parkinson's disease.
Patient is a 86M PMHx of Parkinson's Disease, prior CVA 2010 with no residual deficits, HTN, BPH, diverticulitis s/p partial colectomy ~20 years ago, presenting for multiple falls over the past 1-2 weeks likely mechanical due to deconditioning vs. progression of Parkinson's disease.    Problem/Plan - 1:  ·  Problem: Falls.  Plan: Patient presenting with increasing falls 2-3 with no LOC but has hit head while trying to get up from fall. No AC but is ASA 81mg at home. CT head with no intracranial hemorrhage or calvarial fracture generalized volume loss with small vessel ischemic and lacunar disease. Cervicothoracic levoscoliosis with superimposed approximately 4 mm anterolisthesis of C3 on C4. Multilevel degenerative disc disease with neural foramen narrowing. Patient denied chest pain, SOB, palpitations preceding falls but does admit to lightheadedness when getting to seated position and from seated to standing position. Falls likely mechanical, possibly due to progession of Parkinson's Disease especially in the setting of non-adherence vs. orthostatic hypotension given symptomatic when seated to rise vs. deconditioning. Unlikely neurologic or cardiogenic given lack of symptoms, CT imaging negative.  -PT recommending ALEIDA, pending Auth  -social work consult  -TSH WNL  -B12 WNL  - CK WNL  -fall risk precautions  -ambulate with assistance  -as discussed below, orthostatic hypotension likely contributing factor.     Problem/Plan - 2:  ·  Problem: Parkinsons disease.  Plan: Patient on home Carbidopa/Levodopa 25/250mg q6hrs with non-adherence to medication. Patient follows Dr. Red Sánchez for Parkinson's disease  -c/w home Carbidopa/Levodopa 25/250mg q6hrs   -collateral with home Neurologist, pending call back.     Problem/Plan - 3:  ·  Problem: Orthostatic hypotension.  Plan: Patient admitted to lightheadedness when getting to seated position as well as from seated to standing. Orthostatics positive 4/15/19. In the setting of Parkinson's Disease possible autonomic with hypovolemia contributing given patient reporting decreased PO intake due to fear of falling when trying to get to the bathroom. s/p IV NS 500cc bolus x1 on 4/15 with repeat orthostatics negative  -f/u repeat orthostatics.     Problem/Plan - 4:  ·  Problem: HTN (hypertension).  Plan: Patient on home Novasc 5mg daily and 1/2 tab of Lisinopril 100mg daily at home  -holding for now given normotensive and may contribute to falls  -If BPs elevated, can consider restarting. Currently SBPs in the 120s.     Problem/Plan - 5:  ·  Problem: CVA (cerebral vascular accident).  Plan: Patient and family attest to history of CVA in 2010 with no residual deficits.   -unclear why patient is not on statin at home. will require outpatient collateral from neurologist   -c/w ASA 81mg daily.     Problem/Plan - 6:  Problem: BPH (benign prostatic hyperplasia). Plan: Patient with hx BPH on home finasteride 5mg daily  -c/w PO finasteride 5mg daily.    Problem/Plan - 7:  ·  Problem: Goals of care, counseling/discussion. Plan: Goals of care conversation requested by patient and family which took place on 4/16/19 in the presence of patient, his daughter, and his wife. Patient indicated that he wished to be DNR/DNI. His wishes further clarified that he would want a trial of BiPAP, IVF, IV antibiotics, and NGT but not PEG.   -DNR/DNI order in  -escalate care as per patient's wishes   -TYREE in chart.
Patient is a 86M PMHx of Parkinson's Disease, prior CVA 2010 with no residual deficits, HTN, BPH, diverticulitis s/p partial colectomy ~20 years ago, presenting for multiple falls over the past 1-2 weeks likely mechanical due to deconditioning vs. progression of Parkinson's disease.

## 2019-04-16 NOTE — PROGRESS NOTE ADULT - PROBLEM SELECTOR PLAN 7
F: IVF if orthostatics positive  E: Replete PRN  N: DASH diet  DVT ppx: HSQ  Dispo: [facility]  FULL CODE Goals of care conversation requested by patient and family which took place on 4/16/19 in the presence of patient, his daughter, and his wife. Patient indicated that he wished to be DNR/DNI. His wishes further clarified that he would want a trial of BiPAP, IVF, IV antibiotics, and NGT but not PEG.   -DNR/DNI order in  -escalate care as per patient's wishes   -TYREE in chart

## 2019-04-17 VITALS
SYSTOLIC BLOOD PRESSURE: 143 MMHG | DIASTOLIC BLOOD PRESSURE: 82 MMHG | OXYGEN SATURATION: 96 % | HEART RATE: 62 BPM | TEMPERATURE: 97 F | RESPIRATION RATE: 18 BRPM

## 2019-04-17 DIAGNOSIS — R53.81 OTHER MALAISE: ICD-10-CM

## 2019-04-17 DIAGNOSIS — Z51.5 ENCOUNTER FOR PALLIATIVE CARE: ICD-10-CM
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
## 2019-04-17 DIAGNOSIS — Z71.89 OTHER SPECIFIED COUNSELING: ICD-10-CM

## 2019-04-17 PROCEDURE — 85027 COMPLETE CBC AUTOMATED: CPT

## 2019-04-17 PROCEDURE — 84443 ASSAY THYROID STIM HORMONE: CPT

## 2019-04-17 PROCEDURE — 84100 ASSAY OF PHOSPHORUS: CPT

## 2019-04-17 PROCEDURE — 86901 BLOOD TYPING SEROLOGIC RH(D): CPT

## 2019-04-17 PROCEDURE — 82962 GLUCOSE BLOOD TEST: CPT

## 2019-04-17 PROCEDURE — 73130 X-RAY EXAM OF HAND: CPT

## 2019-04-17 PROCEDURE — 85610 PROTHROMBIN TIME: CPT

## 2019-04-17 PROCEDURE — 36415 COLL VENOUS BLD VENIPUNCTURE: CPT

## 2019-04-17 PROCEDURE — 83735 ASSAY OF MAGNESIUM: CPT

## 2019-04-17 PROCEDURE — 70450 CT HEAD/BRAIN W/O DYE: CPT

## 2019-04-17 PROCEDURE — 82550 ASSAY OF CK (CPK): CPT

## 2019-04-17 PROCEDURE — 85730 THROMBOPLASTIN TIME PARTIAL: CPT

## 2019-04-17 PROCEDURE — 87086 URINE CULTURE/COLONY COUNT: CPT

## 2019-04-17 PROCEDURE — 82607 VITAMIN B-12: CPT

## 2019-04-17 PROCEDURE — 93005 ELECTROCARDIOGRAM TRACING: CPT

## 2019-04-17 PROCEDURE — 80048 BASIC METABOLIC PNL TOTAL CA: CPT

## 2019-04-17 PROCEDURE — 83036 HEMOGLOBIN GLYCOSYLATED A1C: CPT

## 2019-04-17 PROCEDURE — 81001 URINALYSIS AUTO W/SCOPE: CPT

## 2019-04-17 PROCEDURE — 72125 CT NECK SPINE W/O DYE: CPT

## 2019-04-17 PROCEDURE — 71101 X-RAY EXAM UNILAT RIBS/CHEST: CPT

## 2019-04-17 PROCEDURE — 86900 BLOOD TYPING SEROLOGIC ABO: CPT

## 2019-04-17 PROCEDURE — 80053 COMPREHEN METABOLIC PANEL: CPT

## 2019-04-17 PROCEDURE — 86850 RBC ANTIBODY SCREEN: CPT

## 2019-04-17 PROCEDURE — 99239 HOSP IP/OBS DSCHRG MGMT >30: CPT

## 2019-04-17 PROCEDURE — 99497 ADVNCD CARE PLAN 30 MIN: CPT | Mod: 25

## 2019-04-17 PROCEDURE — 99285 EMERGENCY DEPT VISIT HI MDM: CPT | Mod: 25

## 2019-04-17 PROCEDURE — 97162 PT EVAL MOD COMPLEX 30 MIN: CPT

## 2019-04-17 PROCEDURE — 85025 COMPLETE CBC W/AUTO DIFF WBC: CPT

## 2019-04-17 PROCEDURE — 99223 1ST HOSP IP/OBS HIGH 75: CPT

## 2019-04-17 RX ORDER — LOSARTAN POTASSIUM 100 MG/1
0.5 TABLET, FILM COATED ORAL
Qty: 0 | Refills: 0 | COMMUNITY

## 2019-04-17 RX ORDER — FUROSEMIDE 40 MG
0 TABLET ORAL
Qty: 0 | Refills: 0 | COMMUNITY

## 2019-04-17 RX ORDER — AMLODIPINE BESYLATE 2.5 MG/1
1 TABLET ORAL
Qty: 0 | Refills: 0 | COMMUNITY

## 2019-04-17 RX ADMIN — CARBIDOPA AND LEVODOPA 1 TABLET(S): 25; 100 TABLET ORAL at 06:56

## 2019-04-17 RX ADMIN — HEPARIN SODIUM 5000 UNIT(S): 5000 INJECTION INTRAVENOUS; SUBCUTANEOUS at 06:56

## 2019-04-17 NOTE — CONSULT NOTE ADULT - PROBLEM SELECTOR RECOMMENDATION 9
pt with increasing falls and rigidity over the past 2 weeks.but has recently hit his head on the ground CT of head was unremarkable   Falls are likely mechanical and 2/2 progression of disease/? compliance with medication   Seen by PT, will have D/C to [facility] this afternoon

## 2019-04-17 NOTE — CONSULT NOTE ADULT - SUBJECTIVE AND OBJECTIVE BOX
JAKE OLIVIA   [id number]    : 1932    HPI:  Patient is a 86M PMHx of Parkinson's Disease, prior CVA  with no residual deficits, HTN, BPH, diverticulitis s/p partial colectomy ~20 years ago, presenting for multiple falls over the past 1-2 weeks. Patient and family at bedside described that patient has fell ~2-3 times with no LOC and did hit head while trying to get up s/p one of his falls yesterday. Patient endorsing right rib pain along the mid-axillary line and right hand numbness s/p fall and laying on his hand. Right rib pain improved, right hand numbness persists. Prior to increased freq of falling, patient had 1 mechanical fall 6 weeks ago after placing foot wrong on step. Patient denies ever feeling palpitations, chest pain, SOB but admitted to lightheadedness with sitting upward and when quickly standing from seated position. Patient admitted to occasional b/l ankle swelling that resolves at the end of the day and with PO Lasix PRN. Of note, patient is non-adherent with his home Carbidopa/Levodopa 25/250mg q6hrs, lives at home alone with no HHA, manages his own finances, medications, ADLs. Ambulates with cane at baseline. Patient also reported increased urinary frequency but no dysuria, which prompted him to decrease his PO fluid intake for fear of having to get up to use the restroom. Patient denied fevers, chills, night sweats, chest pain, SOB, n/v/d/c, headache, melena, hematochezia, dysphagia.     In the ED: vitals 98.2F, HR 81, /78, RR 20, SpO2 96% RA. Labs s/f WBC 11.18, INR 1.17, BUN 27. UA with trace urine ketones, trace bacteria, trace protein. CT head demonstrated no intracranial hemorrhage or calvarial fracture but with generalized volume loss with small vessel ischemic and lacunar disease. CT cervical spine with cervicothoracic levoscoliosis with superimposed approximately 4 mm anterolisthesis of C3 on C4; multilevel degenerative disc disease with neural foramen narrowing. EKG with NSR and left anterior fascicular block. Patient received IV NS bolus 1L x1. Patient admitted to [facility] for further management of recurrent falls. (2019 18:31)    Palliative Medicine consulted to assist with Advance care planning discussion     PAST MEDICAL & SURGICAL HISTORY:  Stroke: CVA 2010 no residual deficits  BPH (benign prostatic hyperplasia)  Diverticulitis: s/p partial colectomy 20 years ago  HTN (hypertension)  Parkinsons disease  History of partial colectomy: for diverticulitis 20 years ago    FAMILY HISTORY:  No pertinent family history in first degree relatives    ROS:                      Dyspnea (Zoltan 0-10):   0                     N/V (Y/N):   N                           Secretions (Y/N) : N               Agitation(Y/N): N  Pain (Y/N):  pt denies     -Provocation/Palliation:  -Quality/Quantity:  -Radiating:  -Severity:  -Timing/Frequency:  -Impact on ADLs:    General:  debility  HEENT:    Denied  Neck:  Denied  CVS:  Denied  Resp:  Denied  GI:  Denied  :  Denied  Musc: weakness   Neuro: rigidity, mild cogwheeling   Psych:  Denied  Skin:  Denied  Lymph:  Denied    Allergies    No Known Allergies    Intolerances    Opiate Naive (Y/N): Yes  -iStop reviewed (Y/N): YES | Reference #: [id number]  Medications:      MEDICATIONS  (STANDING):  aspirin enteric coated 81 milliGRAM(s) Oral daily  carbidopa/levodopa  25/250 1 Tablet(s) Oral every 6 hours  finasteride 5 milliGRAM(s) Oral daily  heparin  Injectable 5000 Unit(s) SubCutaneous every 8 hours    MEDICATIONS  (PRN):    Labs:    CBC:                        13.7   8.03  )-----------( 297      ( 2019 06:39 )             42.4     CMP:    -    140  |  107  |  14  ----------------------------<  117<H>  3.9   |  22  |  0.70    Ca    8.4      2019 06:39  Mg     2.0     -    Imaging:   EXAM:  XR RIBS-RIGHT-3 VIEWS W PA CXR                          PROCEDURE DATE:  2019      INTERPRETATION:    XR RIBS-RIGHT-3 VIEWS W PA CXR dated 2019 4:23 PM    CLINICAL INFORMATION: Male, 86 years old.  R rib trauma.    PRIOR STUDIES: None.    FINDINGS:  There is uncoiling of the thoracic aorta. Heart size is within   normal limits. There is some right hemithorax volume loss with elevation   of the right hemidiaphragm. No pneumothorax. There may be an acute   fracture deformityinvolving the axillary aspect of the right fifth rib   which is noted in the possibility of a radiopaque be be delineated in the   area of concern.    IMPRESSION:  Oblique nondisplaced fracture of the axillary aspect of the right fifth   rib.  Additional findings as above.      PEx:  T(C): 36.1 (19 @ 08:49), Max: 37.4 (19 @ 21:34)  HR: 62 (19 @ 08:49) (62 - 95)  BP: 143/82 (19 @ 08:49) (143/82 - 163/83)  RR: 18 (19 @ 08:49) (18 - 19)  SpO2: 96% (19 @ 08:49) (96% - 97%)  Wt(kg): --71    I&O's Summary    General: elderly frail male in bed in NAD   HEENT:  NCAT, sclera anicteric, MM dry  Neck: supple, no JVD  CVS: S1S2 RRR bradycardic no MRG  Resp: CTA B/L anteriorally, no RRW  GI: softly distended, mild diffuse tenderness on palpation    : voiding freely   Musc: weakness |strength is 5/5 throughout; normal muscle bulk and tone throughout; no pronator drift. Mild cogwheel rigidity best appreciated on RLE. Bradykinesia present.    Neuro: axox2, no focal deficits   Psych:  calm and cooperative   Skin: ecchymosis to BUE/BLE   Lymph: normal  Preadmit Karnofsky: 40 %    Current Karnofsky:  30-40 %  Cachexia (Y/N):   BMI: 24.9    Advanced Directives:    DNR/DNI    MOLST: done       Decision maker: pt does not have capacity to make complex medical decisions independently, but can have his in put with his choices   Legal surrogate: Ami La: [phone] (dgt)   Alt Surrogate: Gabby Byersenson: [phone] (dgt)     Social History: , 3 dgt1 lives in [facility], 1 RN in [place] and 1 lives in [place], lives alone, has  meals on Wheels, [employer]     GOALS OF CARE DISCUSSION       Palliative care info/counseling provided	           Family meeting       Advanced Directives addressed please see Advance Care Planning Note	           Documentation of GOC: for rehab with transition to LTC with hospice 	          REFERRALS	        Palliative Med        Unit SW/Case Mgmt       Nutrition       PT/OT

## 2019-04-17 NOTE — DISCHARGE NOTE NURSING/CASE MANAGEMENT/SOCIAL WORK - NSDCDPATPORTLINK_GEN_ALL_CORE
You can access the [facility] Patient Portal, offered by [facility], by registering with the following website: [email or web address]

## 2019-04-17 NOTE — CONSULT NOTE ADULT - PROBLEM SELECTOR RECOMMENDATION 2
dx > 10 yrs ago, follows with Dr Sánchez outpatient.  on home Carbidopa/Levodopa but questionable as to if he has been dosing correctly

## 2019-04-17 NOTE — CONSULT NOTE ADULT - PROBLEM SELECTOR RECOMMENDATION 5
Advance care planning meeting  Start time: 9:45A  End time: 10:10A  Total time: 25 min    A face to face meeting to discuss advance care planning was held today regarding: JAKE OLIVIA  Primary decision maker: Ami La dgt  Alternate/surrogate: Gabby Melendez dgt  Discussed advance directives including, but not limited to, healthcare proxy and code status.  Decision regarding code status: DNR/DNI  Documentation completed today: MOLST completed     Reviewed MOLST with pt wishes for EOL care Will need further discussion following ALEIDA. Family to return to [place], [facility] and [place] shortly. Discussed options for transition to LTC following rehab. Further discussed options for hospice at LTC facility as additional level of care in accordance with his wishes.

## 2019-04-17 NOTE — DISCHARGE NOTE NURSING/CASE MANAGEMENT/SOCIAL WORK - NSDCPEPTSTRK_GEN_ALL_CORE
Call 911 for stroke/Stroke support groups for patients, families, and friends/Stroke warning signs and symptoms/Signs and symptoms of stroke/Need for follow up after discharge/Prescribed medications/Risk factors for stroke/Stroke education booklet

## 2019-04-17 NOTE — CONSULT NOTE ADULT - ASSESSMENT
86M PMHx of Parkinson's Disease, prior CVA 2010 with no residual deficits, HTN, BPH, diverticulitis s/p partial colectomy ~20 years ago, presenting for multiple falls over the past 1-2 weeks.

## 2019-04-22 DIAGNOSIS — G20 PARKINSON'S DISEASE: ICD-10-CM

## 2019-04-22 DIAGNOSIS — I10 ESSENTIAL (PRIMARY) HYPERTENSION: ICD-10-CM

## 2019-04-22 DIAGNOSIS — I95.1 ORTHOSTATIC HYPOTENSION: ICD-10-CM

## 2019-04-22 DIAGNOSIS — Z86.73 PERSONAL HISTORY OF TRANSIENT ISCHEMIC ATTACK (TIA), AND CEREBRAL INFARCTION WITHOUT RESIDUAL DEFICITS: ICD-10-CM

## 2019-04-22 DIAGNOSIS — N40.0 BENIGN PROSTATIC HYPERPLASIA WITHOUT LOWER URINARY TRACT SYMPTOMS: ICD-10-CM

## 2019-04-22 DIAGNOSIS — R29.6 REPEATED FALLS: ICD-10-CM

## 2019-04-22 DIAGNOSIS — Z90.49 ACQUIRED ABSENCE OF OTHER SPECIFIED PARTS OF DIGESTIVE TRACT: ICD-10-CM

## 2019-04-22 DIAGNOSIS — M50.31 OTHER CERVICAL DISC DEGENERATION, HIGH CERVICAL REGION: ICD-10-CM

## 2023-04-14 NOTE — PATIENT PROFILE ADULT - OVER THE PAST TWO WEEKS HAVE YOU FELT DOWN, DEPRESSED OR HOPELESS?
-- Medications adjusted for today's visit include:    Increase Lantus to 24 units once daily.    Increase Novolog 8 units three times a day before each main meal, or 4 clicks per CeQur before each meal.    Continue Metformin 2 tablets twice a day, Jardiance once a day, and finish Trulicity. On the day Trulicity would have been due, start Mounjaro 10 mg once a week.     -- Start checking blood sugar 3 times daily: Fasting blood sugar and vary your next 2 readings: Before lunch, before supper, 2 hours after any meal, or bedtime.   -Blood sugar goals should be a fasting blood sugar below 130, and no blood sugars throughout the day over 180 is good.    --Carry glucose tablets/soft peppermints/regular juice or Coke product with you at all times to treat a low blood sugar episode (less than 70). If your blood sugar is between 50-70, Chew 4 tablets or drink 1/2 cup of juice or regular Coke product. If your blood sugar is below 50, double the treatment. Re-check blood sugar in 15 minutes. If still low, repeat this. Always call the clinic to give an update for any low blood sugar episodes.    --Follow-up for your next visit in 8 weeks with logs.    --Please either drop off, fax, or MyChart your readings to me in a couple of weeks if you can.                                                     
no